# Patient Record
Sex: FEMALE | Race: BLACK OR AFRICAN AMERICAN | HISPANIC OR LATINO | URBAN - METROPOLITAN AREA
[De-identification: names, ages, dates, MRNs, and addresses within clinical notes are randomized per-mention and may not be internally consistent; named-entity substitution may affect disease eponyms.]

---

## 2019-02-15 PROBLEM — Z00.00 ENCOUNTER FOR PREVENTIVE HEALTH EXAMINATION: Status: ACTIVE | Noted: 2019-02-15

## 2019-03-05 ENCOUNTER — OUTPATIENT (OUTPATIENT)
Dept: OUTPATIENT SERVICES | Facility: HOSPITAL | Age: 54
LOS: 1 days | Discharge: HOME | End: 2019-03-05
Payer: MEDICARE

## 2019-03-05 DIAGNOSIS — I10 ESSENTIAL (PRIMARY) HYPERTENSION: ICD-10-CM

## 2019-03-05 DIAGNOSIS — Z79.82 LONG TERM (CURRENT) USE OF ASPIRIN: ICD-10-CM

## 2019-03-05 DIAGNOSIS — R55 SYNCOPE AND COLLAPSE: ICD-10-CM

## 2019-03-05 DIAGNOSIS — Z79.84 LONG TERM (CURRENT) USE OF ORAL HYPOGLYCEMIC DRUGS: ICD-10-CM

## 2019-03-05 DIAGNOSIS — E05.90 THYROTOXICOSIS, UNSPECIFIED WITHOUT THYROTOXIC CRISIS OR STORM: ICD-10-CM

## 2019-03-05 DIAGNOSIS — E78.5 HYPERLIPIDEMIA, UNSPECIFIED: ICD-10-CM

## 2019-03-05 DIAGNOSIS — E11.9 TYPE 2 DIABETES MELLITUS WITHOUT COMPLICATIONS: ICD-10-CM

## 2019-03-05 DIAGNOSIS — R00.2 PALPITATIONS: ICD-10-CM

## 2019-03-05 PROCEDURE — 33285 INSJ SUBQ CAR RHYTHM MNTR: CPT

## 2019-03-05 PROCEDURE — C1764: CPT

## 2020-02-26 ENCOUNTER — APPOINTMENT (OUTPATIENT)
Dept: HEART AND VASCULAR | Facility: CLINIC | Age: 55
End: 2020-02-26
Payer: MEDICARE

## 2020-02-26 VITALS
WEIGHT: 106 LBS | HEART RATE: 78 BPM | DIASTOLIC BLOOD PRESSURE: 65 MMHG | SYSTOLIC BLOOD PRESSURE: 133 MMHG | HEIGHT: 60 IN | BODY MASS INDEX: 20.81 KG/M2

## 2020-02-26 DIAGNOSIS — E11.3413 TYPE 2 DIABETES MELLITUS WITH SEVERE NONPROLIFERATIVE DIABETIC RETINOPATHY WITH MACULAR EDEMA, BILATERAL: ICD-10-CM

## 2020-02-26 DIAGNOSIS — I10 ESSENTIAL (PRIMARY) HYPERTENSION: ICD-10-CM

## 2020-02-26 DIAGNOSIS — E11.9 TYPE 2 DIABETES MELLITUS W/OUT COMPLICATIONS: ICD-10-CM

## 2020-02-26 DIAGNOSIS — Z79.4 TYPE 2 DIABETES MELLITUS WITH SEVERE NONPROLIFERATIVE DIABETIC RETINOPATHY WITH MACULAR EDEMA, BILATERAL: ICD-10-CM

## 2020-02-26 DIAGNOSIS — E78.5 HYPERLIPIDEMIA, UNSPECIFIED: ICD-10-CM

## 2020-02-26 PROCEDURE — 93290 INTERROG DEV EVAL ICPMS IP: CPT

## 2020-04-13 PROBLEM — E11.9 DIABETES TYPE 2, CONTROLLED: Status: ACTIVE | Noted: 2020-04-13

## 2020-04-13 PROBLEM — E78.5 HYPERLIPIDEMIA, MILD: Status: ACTIVE | Noted: 2020-04-13

## 2020-04-13 PROBLEM — E11.3413 CONTROLLED TYPE 2 DIABETES MELLITUS WITH BOTH EYES AFFECTED BY SEVERE NONPROLIFERATIVE RETINOPATHY AND MACULAR EDEMA, WITH LONG-TERM CURRENT USE OF INSULIN: Status: RESOLVED | Noted: 2020-04-13 | Resolved: 2020-04-13

## 2020-04-13 RX ORDER — METFORMIN HYDROCHLORIDE 1000 MG/1
1000 TABLET, FILM COATED, EXTENDED RELEASE ORAL
Qty: 60 | Refills: 0 | Status: ACTIVE | COMMUNITY
Start: 2020-04-13

## 2020-04-13 NOTE — HISTORY OF PRESENT ILLNESS
[de-identified] : Ms. Ruiz is a 54 year-old female with type II DM, HLD, and HTN who initially presented with recurrent syncope.  She reported 6 to 7 events with the last event occurring in January 2019.  She denies any prodrome and any significant trauma from the episodes.  She could not identify and triggers or behaviors that lead to the syncopal episodes.  She denied any associated symptoms, including chest pain, SOB, palpitations, dizziness, nausea, diaphoresis, incontinence, and a post-ictal period.  As such she underwent ILR implantation in 3/2019 and presents for device interrogation.  No episodes of syncope or dizziness since last visit

## 2020-04-13 NOTE — PROCEDURE
[de-identified] : Medtronic Linq\par No tachy or bradyarrhythmias\par Patient activations by accident and correspond to NSR

## 2020-04-13 NOTE — DISCUSSION/SUMMARY
[FreeTextEntry1] : Ms. Ruiz is a 54 year-old female with a history of recurrent syncope with no episodes for over 1 year.  I suspect this is related to her efforts to maintain adequate fluid and salt intake and have advised she continue to do so.  Her ILR shows no arrhythmias of concern.  She will return in 6 months or sooner if needed.

## 2020-09-28 ENCOUNTER — APPOINTMENT (OUTPATIENT)
Dept: HEART AND VASCULAR | Facility: CLINIC | Age: 55
End: 2020-09-28
Payer: MEDICARE

## 2020-09-28 VITALS
HEIGHT: 60 IN | OXYGEN SATURATION: 100 % | BODY MASS INDEX: 20.81 KG/M2 | SYSTOLIC BLOOD PRESSURE: 135 MMHG | HEART RATE: 70 BPM | WEIGHT: 106 LBS | DIASTOLIC BLOOD PRESSURE: 63 MMHG | TEMPERATURE: 98.7 F

## 2020-09-28 PROCEDURE — 93285 PRGRMG DEV EVAL SCRMS IP: CPT

## 2020-09-28 RX ORDER — ONDANSETRON 8 MG/1
8 TABLET ORAL
Qty: 30 | Refills: 0 | Status: ACTIVE | COMMUNITY
Start: 2020-09-23

## 2020-09-28 RX ORDER — PIOGLITAZONE HYDROCHLORIDE 15 MG/1
15 TABLET ORAL DAILY
Qty: 30 | Refills: 0 | Status: DISCONTINUED | COMMUNITY
Start: 2020-04-13 | End: 2020-09-28

## 2020-09-28 RX ORDER — PEN NEEDLE, DIABETIC 32GX 5/32"
NEEDLE, DISPOSABLE MISCELLANEOUS
Qty: 180 | Refills: 0 | Status: DISCONTINUED | COMMUNITY
Start: 2020-09-07

## 2020-09-28 RX ORDER — TOPIRAMATE 100 MG/1
100 TABLET, FILM COATED ORAL
Qty: 30 | Refills: 0 | Status: DISCONTINUED | COMMUNITY
Start: 2020-09-23

## 2020-09-28 RX ORDER — MECLIZINE HYDROCHLORIDE 25 MG/1
25 TABLET ORAL
Qty: 30 | Refills: 0 | Status: DISCONTINUED | COMMUNITY
Start: 2020-09-18

## 2020-09-28 RX ORDER — METFORMIN HYDROCHLORIDE 1000 MG/1
1000 TABLET, COATED ORAL
Qty: 180 | Refills: 0 | Status: DISCONTINUED | COMMUNITY
Start: 2020-08-18

## 2020-09-28 RX ORDER — HYDROCHLOROTHIAZIDE 12.5 MG/1
12.5 CAPSULE ORAL DAILY
Qty: 30 | Refills: 0 | Status: DISCONTINUED | COMMUNITY
Start: 2020-04-13 | End: 2020-09-28

## 2020-09-28 RX ORDER — ATORVASTATIN CALCIUM 20 MG/1
20 TABLET, FILM COATED ORAL
Qty: 90 | Refills: 0 | Status: ACTIVE | COMMUNITY
Start: 2020-09-11

## 2020-09-28 RX ORDER — IBUPROFEN 600 MG/1
600 TABLET, FILM COATED ORAL
Qty: 270 | Refills: 0 | Status: DISCONTINUED | COMMUNITY
Start: 2020-08-18

## 2020-09-28 RX ORDER — ISOSORBIDE MONONITRATE 60 MG/1
60 TABLET, EXTENDED RELEASE ORAL
Qty: 90 | Refills: 0 | Status: DISCONTINUED | COMMUNITY
Start: 2020-08-18

## 2020-09-28 RX ORDER — SUMATRIPTAN 50 MG/1
50 TABLET, FILM COATED ORAL
Qty: 9 | Refills: 0 | Status: DISCONTINUED | COMMUNITY
Start: 2020-09-23

## 2020-09-28 RX ORDER — CYCLOBENZAPRINE HYDROCHLORIDE 5 MG/1
5 TABLET, FILM COATED ORAL
Qty: 90 | Refills: 0 | Status: DISCONTINUED | COMMUNITY
Start: 2020-08-18

## 2020-09-28 RX ORDER — AMLODIPINE BESYLATE 2.5 MG/1
2.5 TABLET ORAL
Qty: 30 | Refills: 0 | Status: DISCONTINUED | COMMUNITY
Start: 2020-09-18

## 2020-09-28 RX ORDER — AMANTADINE HYDROCHLORIDE 100 1/1
100 TABLET ORAL
Qty: 265 | Refills: 0 | Status: DISCONTINUED | COMMUNITY
Start: 2020-07-16

## 2020-09-28 RX ORDER — NYSTATIN 100000 [USP'U]/G
100000 POWDER TOPICAL
Qty: 60 | Refills: 0 | Status: DISCONTINUED | COMMUNITY
Start: 2020-04-28

## 2020-09-28 RX ORDER — MECLIZINE HYDROCHLORIDE 12.5 MG/1
12.5 TABLET ORAL
Qty: 90 | Refills: 0 | Status: DISCONTINUED | COMMUNITY
Start: 2020-06-17

## 2020-09-28 RX ORDER — DICLOFENAC SODIUM 50 MG/1
50 TABLET, DELAYED RELEASE ORAL
Qty: 30 | Refills: 0 | Status: DISCONTINUED | COMMUNITY
Start: 2020-09-23

## 2020-09-28 RX ORDER — AMMONIUM LACTATE 12 %
12 CREAM (GRAM) TOPICAL
Qty: 385 | Refills: 0 | Status: DISCONTINUED | COMMUNITY
Start: 2020-09-18

## 2020-09-28 RX ORDER — CLOTRIMAZOLE 10 MG/ML
1 SOLUTION TOPICAL
Qty: 31 | Refills: 0 | Status: DISCONTINUED | COMMUNITY
Start: 2020-04-28

## 2020-09-28 RX ORDER — GABAPENTIN 300 MG/1
300 CAPSULE ORAL
Qty: 90 | Refills: 0 | Status: DISCONTINUED | COMMUNITY
Start: 2020-08-18

## 2020-09-28 RX ORDER — METOPROLOL SUCCINATE 100 MG/1
100 TABLET, EXTENDED RELEASE ORAL DAILY
Qty: 30 | Refills: 5 | Status: DISCONTINUED | COMMUNITY
Start: 2020-04-13 | End: 2020-09-28

## 2020-09-28 RX ORDER — TRAZODONE HYDROCHLORIDE 50 MG/1
50 TABLET ORAL
Qty: 30 | Refills: 0 | Status: DISCONTINUED | COMMUNITY
Start: 2020-08-18

## 2020-09-28 RX ORDER — ALCOHOL ANTISEPTIC PADS
70 PADS, MEDICATED (EA) TOPICAL
Qty: 180 | Refills: 0 | Status: DISCONTINUED | COMMUNITY
Start: 2020-04-13

## 2020-09-28 RX ORDER — MIDODRINE HYDROCHLORIDE 5 MG/1
5 TABLET ORAL
Qty: 20 | Refills: 0 | Status: ACTIVE | COMMUNITY
Start: 2020-09-18

## 2020-09-28 RX ORDER — DICLOFENAC POTASSIUM 50 MG/1
50 POWDER, FOR SOLUTION ORAL
Qty: 9 | Refills: 0 | Status: DISCONTINUED | COMMUNITY
Start: 2020-09-23

## 2020-09-28 RX ORDER — MIRTAZAPINE 45 MG/1
45 TABLET, FILM COATED ORAL
Qty: 30 | Refills: 0 | Status: DISCONTINUED | COMMUNITY
Start: 2020-08-21

## 2020-09-28 NOTE — DISCUSSION/SUMMARY
[FreeTextEntry1] : Ms. Ruiz is a 54 year-old woman with a history of recurrent unexplained syncope without prodrome. She was symptom free x 1 year, but unfortunately had another fainting episode. Her ILR interrogation revealed the cause of the syncope is not due to an arrhythmia. Her symptoms did previously improve with adequate fluid and salt intake and we have advised she continue to do so. Her medication list reveals atenolol and chlorthalidone, but also midodrine. We will discuss the the need for chlorthalidone with her PMD Dr. Palma as this may be contributing to her symptoms. Ms. Ruiz appeared to understand the whole discussion and verbalized that all of her questions were answered to her satisfaction.

## 2020-09-28 NOTE — PROCEDURE
[de-identified] : Medtronic Linq\par No tachy or bradyarrhythmias\par Patient activations by accident and correspond to NSR

## 2020-09-28 NOTE — HISTORY OF PRESENT ILLNESS
[de-identified] : Ms. Ruiz is a 54 year-old woman with type II DM, HLD, and HTN who initially presented with recurrent unexplained syncope.  She reported 6 to 7 events with the last event occurring in January 2019.  She denies any prodrome and any significant trauma from the episodes.  She could not identify and triggers or behaviors that lead to the syncopal episodes.  She denied any associated symptoms, including chest pain, SOB, palpitations, dizziness, nausea, diaphoresis, incontinence, and a post-ictal period.  As such she underwent ILR implantation in 3/2019 and had no events until July 2020. On 7/12/20, she had another episode and she presented to the [facility] ER who interrogated her device and discharged her home after telling her that her workup was normal. She also checked her blood sugar level after this episode and this was also normal.\par \par She is on multiple medications that may contribute to vasodilatory syncope and also on midodrine. These are managed by her PMD.

## 2021-01-25 ENCOUNTER — APPOINTMENT (OUTPATIENT)
Dept: HEART AND VASCULAR | Facility: CLINIC | Age: 56
End: 2021-01-25
Payer: MEDICARE

## 2021-01-25 VITALS
BODY MASS INDEX: 20.42 KG/M2 | HEIGHT: 60 IN | SYSTOLIC BLOOD PRESSURE: 129 MMHG | WEIGHT: 104 LBS | TEMPERATURE: 97 F | HEART RATE: 69 BPM | DIASTOLIC BLOOD PRESSURE: 74 MMHG

## 2021-01-25 PROCEDURE — 99212 OFFICE O/P EST SF 10 MIN: CPT | Mod: 25

## 2021-01-25 PROCEDURE — 99072 ADDL SUPL MATRL&STAF TM PHE: CPT

## 2021-01-25 PROCEDURE — 93285 PRGRMG DEV EVAL SCRMS IP: CPT

## 2021-01-25 RX ORDER — BUSPIRONE HYDROCHLORIDE 10 MG/1
10 TABLET ORAL DAILY
Refills: 0 | Status: ACTIVE | COMMUNITY
Start: 2020-09-18

## 2021-01-26 NOTE — DISCUSSION/SUMMARY
[FreeTextEntry1] : Ms. Riuz is a 55 year-old woman with a history of recurrent unexplained syncope without prodrome. She was symptom free x 1 year, but unfortunately had another fainting episode.  This did not correlate with any arrhythmia on ILR interrogation.  She has recently been diagnosed with BPPV.  Advised to continue adequate fluid and salt intake.  She will return for follow-up in six months or sooner as needed.

## 2021-01-26 NOTE — REASON FOR VISIT
[[employer] ] : provided by [employer]   [FreeTextEntry1] : [id number] [TWNoteComboBox1] : [place]

## 2021-01-26 NOTE — PROCEDURE
[de-identified] : Medtronic Linq\par No tachy or bradyarrhythmias\par Patient activations by accident and correspond to NSR

## 2021-01-26 NOTE — HISTORY OF PRESENT ILLNESS
[de-identified] : Ms. Ruiz is a 54 year-old woman with type II DM, HLD, and HTN who initially presented with recurrent unexplained syncope.  She reported 6 to 7 events with the last event occurring in January 2019.  She denies any prodrome and any significant trauma from the episodes.  She could not identify and triggers or behaviors that lead to the syncopal episodes.  She denied any associated symptoms, including chest pain, SOB, palpitations, dizziness, nausea, diaphoresis, incontinence, and a post-ictal period.  As such she underwent ILR implantation in 3/2019 and had no events until July 2020. On 7/12/20, she had another episode and she presented to the [facility] ER who interrogated her device and discharged her home after telling her that her workup was normal. She also checked her blood sugar level after this episode and this was also normal.\par \par No recurrent syncope since her last visit.  Interval history significant for diagnosis of BPPV; being managed at [facility].  \par  \par She is on multiple medications that may contribute to vasodilatory syncope and also on midodrine. These are managed by her PMD.

## 2021-02-26 ENCOUNTER — APPOINTMENT (OUTPATIENT)
Dept: HEART AND VASCULAR | Facility: CLINIC | Age: 56
End: 2021-02-26
Payer: MEDICARE

## 2021-02-26 ENCOUNTER — NON-APPOINTMENT (OUTPATIENT)
Age: 56
End: 2021-02-26

## 2021-02-26 PROCEDURE — 93298 REM INTERROG DEV EVAL SCRMS: CPT

## 2021-02-26 PROCEDURE — G2066: CPT

## 2021-04-07 ENCOUNTER — APPOINTMENT (OUTPATIENT)
Dept: HEART AND VASCULAR | Facility: CLINIC | Age: 56
End: 2021-04-07
Payer: MEDICARE

## 2021-04-07 ENCOUNTER — NON-APPOINTMENT (OUTPATIENT)
Age: 56
End: 2021-04-07

## 2021-04-07 PROCEDURE — 93298 REM INTERROG DEV EVAL SCRMS: CPT

## 2021-04-07 PROCEDURE — G2066: CPT

## 2021-05-18 ENCOUNTER — APPOINTMENT (OUTPATIENT)
Dept: HEART AND VASCULAR | Facility: CLINIC | Age: 56
End: 2021-05-18
Payer: MEDICARE

## 2021-05-18 ENCOUNTER — NON-APPOINTMENT (OUTPATIENT)
Age: 56
End: 2021-05-18

## 2021-05-18 PROCEDURE — G2066: CPT

## 2021-05-18 PROCEDURE — 93298 REM INTERROG DEV EVAL SCRMS: CPT

## 2021-06-22 ENCOUNTER — APPOINTMENT (OUTPATIENT)
Dept: HEART AND VASCULAR | Facility: CLINIC | Age: 56
End: 2021-06-22
Payer: MEDICARE

## 2021-06-22 ENCOUNTER — NON-APPOINTMENT (OUTPATIENT)
Age: 56
End: 2021-06-22

## 2021-06-22 PROCEDURE — 93298 REM INTERROG DEV EVAL SCRMS: CPT

## 2021-06-22 PROCEDURE — G2066: CPT

## 2021-07-26 ENCOUNTER — APPOINTMENT (OUTPATIENT)
Dept: HEART AND VASCULAR | Facility: CLINIC | Age: 56
End: 2021-07-26
Payer: MEDICARE

## 2021-07-26 VITALS
HEIGHT: 60 IN | TEMPERATURE: 97.2 F | BODY MASS INDEX: 20.03 KG/M2 | HEART RATE: 72 BPM | DIASTOLIC BLOOD PRESSURE: 76 MMHG | SYSTOLIC BLOOD PRESSURE: 134 MMHG | WEIGHT: 102 LBS

## 2021-07-26 PROCEDURE — 93285 PRGRMG DEV EVAL SCRMS IP: CPT

## 2021-07-26 RX ORDER — ATENOLOL AND CHLORTHALIDONE 50; 25 MG/1; MG/1
50-25 TABLET ORAL
Qty: 90 | Refills: 0 | Status: DISCONTINUED | COMMUNITY
Start: 2020-08-18 | End: 2021-07-26

## 2021-07-26 RX ORDER — OMEPRAZOLE 40 MG/1
40 CAPSULE, DELAYED RELEASE ORAL
Qty: 30 | Refills: 0 | Status: COMPLETED | COMMUNITY
Start: 2021-04-09

## 2021-07-26 RX ORDER — AMLODIPINE BESYLATE 5 MG/1
5 TABLET ORAL
Qty: 30 | Refills: 0 | Status: ACTIVE | COMMUNITY
Start: 2021-07-22

## 2021-07-26 RX ORDER — DAPAGLIFLOZIN 10 MG/1
10 TABLET, FILM COATED ORAL
Qty: 90 | Refills: 0 | Status: ACTIVE | COMMUNITY
Start: 2021-06-28

## 2021-07-26 RX ORDER — DICLOFENAC SODIUM 75 MG/1
75 TABLET, DELAYED RELEASE ORAL
Qty: 60 | Refills: 0 | Status: COMPLETED | COMMUNITY
Start: 2021-07-02

## 2021-07-26 RX ORDER — EMPAGLIFLOZIN 25 MG/1
25 TABLET, FILM COATED ORAL
Qty: 90 | Refills: 0 | Status: COMPLETED | COMMUNITY
Start: 2021-07-22

## 2021-07-26 RX ORDER — ATENOLOL 50 MG/1
50 TABLET ORAL
Qty: 90 | Refills: 0 | Status: COMPLETED | COMMUNITY
Start: 2021-07-22

## 2021-07-26 RX ORDER — GABAPENTIN 100 MG/1
100 CAPSULE ORAL
Qty: 30 | Refills: 0 | Status: ACTIVE | COMMUNITY
Start: 2021-07-22

## 2021-07-26 RX ORDER — HYDROCORTISONE 1 %
12 CREAM (GRAM) TOPICAL
Qty: 400 | Refills: 0 | Status: COMPLETED | COMMUNITY
Start: 2021-02-17

## 2021-07-26 RX ORDER — ATORVASTATIN CALCIUM 40 MG/1
40 TABLET, FILM COATED ORAL
Qty: 90 | Refills: 0 | Status: ACTIVE | COMMUNITY
Start: 2021-03-01

## 2021-07-26 RX ORDER — BLOOD SUGAR DIAGNOSTIC
STRIP MISCELLANEOUS
Qty: 50 | Refills: 0 | Status: COMPLETED | COMMUNITY
Start: 2021-05-14

## 2021-07-26 RX ORDER — PROPRANOLOL HYDROCHLORIDE 80 MG/1
80 CAPSULE, EXTENDED RELEASE ORAL
Qty: 90 | Refills: 0 | Status: ACTIVE | COMMUNITY
Start: 2021-06-28

## 2021-07-26 RX ORDER — GLIPIZIDE 10 MG/1
10 TABLET ORAL
Qty: 90 | Refills: 0 | Status: DISCONTINUED | COMMUNITY
Start: 2020-08-18 | End: 2021-07-26

## 2021-07-26 RX ORDER — EMPAGLIFLOZIN 10 MG/1
10 TABLET, FILM COATED ORAL
Qty: 30 | Refills: 0 | Status: COMPLETED | COMMUNITY
Start: 2021-06-12

## 2021-07-26 RX ORDER — TIZANIDINE 4 MG/1
4 TABLET ORAL
Qty: 30 | Refills: 0 | Status: ACTIVE | COMMUNITY
Start: 2021-07-22

## 2021-07-26 RX ORDER — BUSPIRONE HYDROCHLORIDE 30 MG/1
30 TABLET ORAL
Qty: 60 | Refills: 0 | Status: COMPLETED | COMMUNITY
Start: 2021-07-22

## 2021-07-26 RX ORDER — CHLORHEXIDINE GLUCONATE, 0.12% ORAL RINSE 1.2 MG/ML
0.12 SOLUTION DENTAL
Qty: 946 | Refills: 0 | Status: COMPLETED | COMMUNITY
Start: 2021-05-25

## 2021-07-26 RX ORDER — LOSARTAN POTASSIUM 50 MG/1
50 TABLET, FILM COATED ORAL
Qty: 90 | Refills: 0 | Status: ACTIVE | COMMUNITY
Start: 2021-06-28

## 2021-07-26 RX ORDER — DICLOFENAC SODIUM 1% 10 MG/G
1 GEL TOPICAL
Qty: 100 | Refills: 0 | Status: COMPLETED | COMMUNITY
Start: 2021-06-12

## 2021-07-26 RX ORDER — FLUTICASONE PROPIONATE 50 UG/1
50 SPRAY, METERED NASAL
Qty: 16 | Refills: 0 | Status: COMPLETED | COMMUNITY
Start: 2021-07-20

## 2021-07-26 RX ORDER — AZELASTINE HYDROCHLORIDE 137 UG/1
0.1 SPRAY, METERED NASAL
Qty: 30 | Refills: 0 | Status: COMPLETED | COMMUNITY
Start: 2021-07-20

## 2021-07-26 NOTE — HISTORY OF PRESENT ILLNESS
[de-identified] : Ms. Ruiz is a 55 year-old woman with type II DM, HLD, and HTN who initially presented with recurrent unexplained syncope.  She reported 6 to 7 events with the last event occurring in January 2019.  She denies any prodrome and any significant trauma from the episodes.  She could not identify and triggers or behaviors that lead to the syncopal episodes.  She denied any associated symptoms, including chest pain, SOB, palpitations, dizziness, nausea, diaphoresis, incontinence, and a post-ictal period.  As such she underwent ILR implantation in 3/2019 and had no events until July 2020. On 7/12/20, she had another episode and she presented to the [facility] ER who interrogated her device and discharged her home after telling her that her workup was normal. She also checked her blood sugar level after this episode and this was also normal.   History significant for diagnosis of BPPV; being managed at [facility].  \par \par No recurrent syncope since her last visit.\par  \par She is on multiple medications that may contribute to vasodilatory syncope and also on midodrine. These are managed by her PMD.

## 2021-07-26 NOTE — DISCUSSION/SUMMARY
[FreeTextEntry1] : Ms. Ruiz is a 55 year-old woman with a history of recurrent unexplained syncope without prodrome. She was symptom free x 1 year, but unfortunately had another fainting episode.  This did not correlate with any arrhythmia on ILR interrogation.  She has recently been diagnosed with BPPV but reports this is managed with current medication regimen.  Advised to continue adequate fluid and salt intake.  She will return for follow-up in six months or sooner as needed.

## 2021-07-26 NOTE — PROCEDURE
[de-identified] : Medtronic Linq\par sensing 0.58mV\par No tachy or bradyarrhythmias\par Patient activations correspond to NSR

## 2021-08-30 ENCOUNTER — NON-APPOINTMENT (OUTPATIENT)
Age: 56
End: 2021-08-30

## 2021-08-30 ENCOUNTER — APPOINTMENT (OUTPATIENT)
Dept: HEART AND VASCULAR | Facility: CLINIC | Age: 56
End: 2021-08-30
Payer: MEDICARE

## 2021-08-30 PROCEDURE — 93298 REM INTERROG DEV EVAL SCRMS: CPT

## 2021-08-30 PROCEDURE — G2066: CPT

## 2021-09-29 ENCOUNTER — RESULT REVIEW (OUTPATIENT)
Age: 56
End: 2021-09-29

## 2021-10-04 ENCOUNTER — NON-APPOINTMENT (OUTPATIENT)
Age: 56
End: 2021-10-04

## 2021-10-04 ENCOUNTER — APPOINTMENT (OUTPATIENT)
Dept: HEART AND VASCULAR | Facility: CLINIC | Age: 56
End: 2021-10-04
Payer: MEDICARE

## 2021-10-04 PROCEDURE — 93298 REM INTERROG DEV EVAL SCRMS: CPT

## 2021-10-04 PROCEDURE — G2066: CPT

## 2021-10-06 PROBLEM — I10 ESSENTIAL HYPERTENSION: Status: ACTIVE | Noted: 2020-04-13

## 2021-10-13 ENCOUNTER — INPATIENT (INPATIENT)
Facility: HOSPITAL | Age: 56
LOS: 1 days | Discharge: HOME | DRG: 244 | End: 2021-10-15
Attending: INTERNAL MEDICINE | Admitting: INTERNAL MEDICINE
Payer: MEDICARE

## 2021-10-13 VITALS
OXYGEN SATURATION: 98 % | DIASTOLIC BLOOD PRESSURE: 85 MMHG | SYSTOLIC BLOOD PRESSURE: 139 MMHG | RESPIRATION RATE: 16 BRPM | WEIGHT: 108.03 LBS | TEMPERATURE: 98 F | HEART RATE: 75 BPM

## 2021-10-13 DIAGNOSIS — G43.909 MIGRAINE, UNSPECIFIED, NOT INTRACTABLE, WITHOUT STATUS MIGRAINOSUS: ICD-10-CM

## 2021-10-13 DIAGNOSIS — Z29.9 ENCOUNTER FOR PROPHYLACTIC MEASURES, UNSPECIFIED: ICD-10-CM

## 2021-10-13 DIAGNOSIS — E11.9 TYPE 2 DIABETES MELLITUS WITHOUT COMPLICATIONS: ICD-10-CM

## 2021-10-13 DIAGNOSIS — F41.9 ANXIETY DISORDER, UNSPECIFIED: ICD-10-CM

## 2021-10-13 DIAGNOSIS — R55 SYNCOPE AND COLLAPSE: ICD-10-CM

## 2021-10-13 DIAGNOSIS — I10 ESSENTIAL (PRIMARY) HYPERTENSION: ICD-10-CM

## 2021-10-13 DIAGNOSIS — E78.5 HYPERLIPIDEMIA, UNSPECIFIED: ICD-10-CM

## 2021-10-13 DIAGNOSIS — Z98.890 OTHER SPECIFIED POSTPROCEDURAL STATES: Chronic | ICD-10-CM

## 2021-10-13 LAB
ALBUMIN SERPL ELPH-MCNC: 5.2 G/DL — HIGH (ref 3.3–5)
ALP SERPL-CCNC: 102 U/L — SIGNIFICANT CHANGE UP (ref 40–120)
ALT FLD-CCNC: 19 U/L — SIGNIFICANT CHANGE UP (ref 10–45)
ANION GAP SERPL CALC-SCNC: 11 MMOL/L — SIGNIFICANT CHANGE UP (ref 5–17)
APTT BLD: 30.4 SEC — SIGNIFICANT CHANGE UP (ref 27.5–35.5)
AST SERPL-CCNC: 22 U/L — SIGNIFICANT CHANGE UP (ref 10–40)
BASOPHILS # BLD AUTO: 0.03 K/UL — SIGNIFICANT CHANGE UP (ref 0–0.2)
BASOPHILS NFR BLD AUTO: 0.8 % — SIGNIFICANT CHANGE UP (ref 0–2)
BILIRUB SERPL-MCNC: 0.2 MG/DL — SIGNIFICANT CHANGE UP (ref 0.2–1.2)
BUN SERPL-MCNC: 20 MG/DL — SIGNIFICANT CHANGE UP (ref 7–23)
CALCIUM SERPL-MCNC: 10.5 MG/DL — SIGNIFICANT CHANGE UP (ref 8.4–10.5)
CHLORIDE SERPL-SCNC: 100 MMOL/L — SIGNIFICANT CHANGE UP (ref 96–108)
CO2 SERPL-SCNC: 31 MMOL/L — SIGNIFICANT CHANGE UP (ref 22–31)
CREAT SERPL-MCNC: 0.93 MG/DL — SIGNIFICANT CHANGE UP (ref 0.5–1.3)
EOSINOPHIL # BLD AUTO: 0.05 K/UL — SIGNIFICANT CHANGE UP (ref 0–0.5)
EOSINOPHIL NFR BLD AUTO: 1.4 % — SIGNIFICANT CHANGE UP (ref 0–6)
GLUCOSE BLDC GLUCOMTR-MCNC: 244 MG/DL — HIGH (ref 70–99)
GLUCOSE SERPL-MCNC: 260 MG/DL — HIGH (ref 70–99)
HCT VFR BLD CALC: 45.4 % — HIGH (ref 34.5–45)
HGB BLD-MCNC: 14.2 G/DL — SIGNIFICANT CHANGE UP (ref 11.5–15.5)
IMM GRANULOCYTES NFR BLD AUTO: 0.3 % — SIGNIFICANT CHANGE UP (ref 0–1.5)
INR BLD: 1.13 — SIGNIFICANT CHANGE UP (ref 0.88–1.16)
LYMPHOCYTES # BLD AUTO: 1.02 K/UL — SIGNIFICANT CHANGE UP (ref 1–3.3)
LYMPHOCYTES # BLD AUTO: 27.9 % — SIGNIFICANT CHANGE UP (ref 13–44)
MAGNESIUM SERPL-MCNC: 2 MG/DL — SIGNIFICANT CHANGE UP (ref 1.6–2.6)
MCHC RBC-ENTMCNC: 27.7 PG — SIGNIFICANT CHANGE UP (ref 27–34)
MCHC RBC-ENTMCNC: 31.3 GM/DL — LOW (ref 32–36)
MCV RBC AUTO: 88.5 FL — SIGNIFICANT CHANGE UP (ref 80–100)
MONOCYTES # BLD AUTO: 0.33 K/UL — SIGNIFICANT CHANGE UP (ref 0–0.9)
MONOCYTES NFR BLD AUTO: 9 % — SIGNIFICANT CHANGE UP (ref 2–14)
NEUTROPHILS # BLD AUTO: 2.22 K/UL — SIGNIFICANT CHANGE UP (ref 1.8–7.4)
NEUTROPHILS NFR BLD AUTO: 60.6 % — SIGNIFICANT CHANGE UP (ref 43–77)
NRBC # BLD: 0 /100 WBCS — SIGNIFICANT CHANGE UP (ref 0–0)
NT-PROBNP SERPL-SCNC: 62 PG/ML — SIGNIFICANT CHANGE UP (ref 0–300)
PLATELET # BLD AUTO: 276 K/UL — SIGNIFICANT CHANGE UP (ref 150–400)
POTASSIUM SERPL-MCNC: 3.7 MMOL/L — SIGNIFICANT CHANGE UP (ref 3.5–5.3)
POTASSIUM SERPL-SCNC: 3.7 MMOL/L — SIGNIFICANT CHANGE UP (ref 3.5–5.3)
PROT SERPL-MCNC: 8.3 G/DL — SIGNIFICANT CHANGE UP (ref 6–8.3)
PROTHROM AB SERPL-ACNC: 13.5 SEC — SIGNIFICANT CHANGE UP (ref 10.6–13.6)
RBC # BLD: 5.13 M/UL — SIGNIFICANT CHANGE UP (ref 3.8–5.2)
RBC # FLD: 13.4 % — SIGNIFICANT CHANGE UP (ref 10.3–14.5)
SARS-COV-2 RNA SPEC QL NAA+PROBE: NEGATIVE — SIGNIFICANT CHANGE UP
SODIUM SERPL-SCNC: 142 MMOL/L — SIGNIFICANT CHANGE UP (ref 135–145)
TROPONIN T SERPL-MCNC: 0.01 NG/ML — SIGNIFICANT CHANGE UP (ref 0–0.01)
WBC # BLD: 3.66 K/UL — LOW (ref 3.8–10.5)
WBC # FLD AUTO: 3.66 K/UL — LOW (ref 3.8–10.5)

## 2021-10-13 PROCEDURE — 93010 ELECTROCARDIOGRAM REPORT: CPT

## 2021-10-13 PROCEDURE — 71045 X-RAY EXAM CHEST 1 VIEW: CPT | Mod: 26

## 2021-10-13 PROCEDURE — 99285 EMERGENCY DEPT VISIT HI MDM: CPT

## 2021-10-13 RX ORDER — DEXTROSE 50 % IN WATER 50 %
12.5 SYRINGE (ML) INTRAVENOUS ONCE
Refills: 0 | Status: DISCONTINUED | OUTPATIENT
Start: 2021-10-13 | End: 2021-10-15

## 2021-10-13 RX ORDER — SODIUM CHLORIDE 9 MG/ML
1000 INJECTION, SOLUTION INTRAVENOUS
Refills: 0 | Status: DISCONTINUED | OUTPATIENT
Start: 2021-10-13 | End: 2021-10-15

## 2021-10-13 RX ORDER — DEXTROSE 50 % IN WATER 50 %
25 SYRINGE (ML) INTRAVENOUS ONCE
Refills: 0 | Status: DISCONTINUED | OUTPATIENT
Start: 2021-10-13 | End: 2021-10-15

## 2021-10-13 RX ORDER — PSYLLIUM SEED (WITH DEXTROSE)
1 POWDER (GRAM) ORAL
Qty: 0 | Refills: 0 | DISCHARGE

## 2021-10-13 RX ORDER — LACTOBACILLUS ACIDOPHILUS 100MM CELL
1 CAPSULE ORAL
Qty: 0 | Refills: 0 | DISCHARGE

## 2021-10-13 RX ORDER — INFLUENZA VIRUS VACCINE 15; 15; 15; 15 UG/.5ML; UG/.5ML; UG/.5ML; UG/.5ML
0.5 SUSPENSION INTRAMUSCULAR ONCE
Refills: 0 | Status: DISCONTINUED | OUTPATIENT
Start: 2021-10-13 | End: 2021-10-15

## 2021-10-13 RX ORDER — ATENOLOL AND CHLORTHALIDONE 50; 25 MG/1; MG/1
1 TABLET ORAL
Qty: 0 | Refills: 0 | DISCHARGE

## 2021-10-13 RX ORDER — INSULIN LISPRO 100/ML
VIAL (ML) SUBCUTANEOUS
Refills: 0 | Status: DISCONTINUED | OUTPATIENT
Start: 2021-10-13 | End: 2021-10-15

## 2021-10-13 RX ORDER — DEXTROSE 50 % IN WATER 50 %
15 SYRINGE (ML) INTRAVENOUS ONCE
Refills: 0 | Status: DISCONTINUED | OUTPATIENT
Start: 2021-10-13 | End: 2021-10-15

## 2021-10-13 RX ORDER — ATORVASTATIN CALCIUM 80 MG/1
1 TABLET, FILM COATED ORAL
Qty: 0 | Refills: 0 | DISCHARGE

## 2021-10-13 RX ORDER — GLUCAGON INJECTION, SOLUTION 0.5 MG/.1ML
1 INJECTION, SOLUTION SUBCUTANEOUS ONCE
Refills: 0 | Status: DISCONTINUED | OUTPATIENT
Start: 2021-10-13 | End: 2021-10-15

## 2021-10-13 RX ADMIN — Medication 2: at 23:20

## 2021-10-13 NOTE — ED ADULT NURSE NOTE - NSICDXPASTMEDICALHX_GEN_ALL_CORE_FT
PAST MEDICAL HISTORY:  Herniated cervical disc     HLD (hyperlipidemia)     HTN (hypertension)     Type 2 diabetes mellitus

## 2021-10-13 NOTE — ED ADULT TRIAGE NOTE - CHIEF COMPLAINT QUOTE
Patient arrives ambulatory reporting palpitations.  Patient had insertable cardiac monitor and was sent by Dr. Ivonne Booth for evaluation.  Denies chest pain/SOB.

## 2021-10-13 NOTE — H&P ADULT - HISTORY OF PRESENT ILLNESS
History obtained from chart and via  # [id number]    Ms. Ruiz is a 55 yo F with type II DM, HLD, HTN, Migraine HA, Low back pain, hx of leukocytosis followed by outpt Heme now WBC normalized, and h/o recurrent unexplained syncope s/p ILR 3/2019 followed by EP Dr. Booth she had not had any recurrent syncope since her last visit on 7/27/21, but an ILR alert showed she had a 15 sec pause which she endorsed she felt symptomatic. She was encouraged to come to ED for further evaluation and plan for dual chamber PPM.  Pt reports that last night while walking from bedroom to bathroom she suddenly felt generalized weakness and called her daughter to help her.  She does not recall passing out but she cannot recollect the entire event of how she got back to bedroom where daughter brought her to lay down.  Pt reports she felt too weak to eat anything and eventually fell asleep.  She denied experiencing any associated symptoms, including chest pain, SOB, palpitations, dizziness, nausea, diaphoresis, incontinence, and a post-ictal period.  Today she reports she feels much better.      In ER, VSS, ECG NSR with nonspecific ST changes, Med negative.  Pt now admitted to Cardiology for telemetry monitoring with plan for PPM tomorrow.      Of Note: Pt reports recently having undergone a Left breast biopsy 9/29/21 2/2 abnormal Sonogram for which she is awaiting results.          **Pt unable to provide list of Medications, Pharmacy closed, and no one at her house.  Meds noted from [facility] for recent Rx are: Amlodipine 5mg, Amlodipine-Olmesartan 5mg-20mg, Farxiga 10mg, Losartan 50mg, MIrtazapine 45mg, Cyclobenzaprine 5mg, Sumatriptan 50mg, Metformin 1000mg BID, Buspirone 30mg, Atorvastatin 20mg, Glipizide 5mg, Tizanidine 4mg, Omeprazole 40mg, Zofran 8mg.

## 2021-10-13 NOTE — H&P ADULT - PROBLEM SELECTOR PLAN 1
-presented with episode of syncope last night with documented 15sec pause on ILR corrosponding to event timing.  -Currently asx, continue telemetry monitoring.  -Hold BB  -Keep NPO after MN for planned PPM with EP Dr Booth  -Check ECHO  -Confirm Med in AM.

## 2021-10-13 NOTE — CONSULT NOTE ADULT - SUBJECTIVE AND OBJECTIVE BOX
HPI:  Ms. Ruiz is a 55 yo F with type II DM, HLD, and HTN and h/o recurrent unexplained syncope s/p ILR 3/2019 followed by EP Dr. Booth she had not had any recurrent syncope since her last visit on 7/27/21, but an ILR alert today showed she had a 15 sec pause which she endorsed she felt symptomatic. She was encouraged to come to ED for further evaluation and plan for dual chamber PPM.     Briefly, she reported 6 to 7 events with the last event occurring in January 2019. She denies any prodrome and any significant trauma from the episodes. She could not identify and triggers or behaviors that lead to the syncopal episodes. She denied any associated symptoms, including chest pain, SOB, palpitations, dizziness, nausea, diaphoresis, incontinence, and a post-ictal period. As such she underwent ILR implantation in 3/2019 and had no events until July 2020. On 7/12/20, she had another episode and she presented to the [facility] ER who interrogated her device and discharged her home after telling her that her workup was normal. She also checked her blood sugar level after this episode and this was also normal. History significant for diagnosis of BPPV; being managed at [facility]. She is on multiple medications that may contribute to vasodilatory syncope and also on midodrine. These are managed by her PMD.       As per pt, she was feeling very faint arond the time of the 15 sec pause and states she felt like she would pass out. The event was logged at 10/12/21 at 9:30PM last night. After speaking with her on the phone, she was encouraged to come in the ED for further evaluation.    PAST MEDICAL & SURGICAL HISTORY:  HTN (hypertension)  Type 2 diabetes mellitus  Herniated cervical disc  HLD (hyperlipidemia)    Social History:  no smoking, no drugs, no alcohol    Home medications:  · amLODIPine Besylate 5 MG Oral Tablet   · Aspirin 81 81 MG TBEC; TAKE 1 TABLET DAILY   · Atorvastatin Calcium 20 MG Oral Tablet   · Atorvastatin Calcium 40 MG Oral Tablet   · busPIRone HCl - 10 MG Oral Tablet; TAKE 1 TABLET DAILY   · Farxiga 10 MG Oral Tablet   · Gabapentin 100 MG Oral Capsule   · Losartan Potassium 50 MG Oral Tablet   · metFORMIN HCl ER (MOD) 1000 MG Oral Tablet Extended Release 24 Hour; TAKE 1 TABLET Every twelve hours   · Midodrine HCl - 5 MG Oral Tablet   · Ondansetron HCl - 8 MG Oral Tablet   · Propranolol HCl ER 80 MG Oral Capsule Extended Release 24 Hour   · tiZANidine HCl - 4 MG Oral Tablet     Inpatient Medications:       Allergies:   No Known Allergies    ROS:   CONSTITUTIONAL: No fever, weight loss + fatigue  EYES: Pt denies  RESPIRATORY: No cough, wheezing, chills or hemoptysis; No Shortness of Breath  CARDIOVASCULAR: see HPI  GASTROINTESTINAL: Pt denies  NEUROLOGICAL: Pt denies  SKIN: Pt denies   PSYCHIATRIC: Pt denies  HEME/LYMPH: Pt denies    PHYSICAL:  T(C): 36.8 (10-13-21 @ 14:06), Max: 36.8 (10-13-21 @ 14:06)  HR: 75 (10-13-21 @ 14:06) (75 - 75)  BP: 139/85 (10-13-21 @ 14:06) (139/85 - 139/85)  RR: 16 (10-13-21 @ 14:06) (16 - 16)  SpO2: 98% (10-13-21 @ 14:06) (98% - 98%)    Appearance: No acute distress, well developed  Eyes: normal appearing conjunctiva, pupils and eyelids  Cardiovascular: Normal S1 S2, No JVD, No murmurs, No edema  Respiratory: Lungs clear to auscultation	bilaterally.  No wheeze, rhonchi, rales noted  Gastrointestinal:  Soft, NT/ND 	  Neurologic:  No deficit noted  Psych: A&Ox3, normal mood/affect  Musculoskeletal: normal gait  Skin: no rash noted, normal color and pigmentation.        LABS:                        14.2   3.66  )-----------( 276      ( 13 Oct 2021 15:00 )             45.4   10-13    142  |  100  |  20  ----------------------------<  260<H>  3.7   |  31  |  0.93    Ca    10.5      13 Oct 2021 15:00  Mg     2.0     10-13    TPro  8.3  /  Alb  5.2<H>  /  TBili  0.2  /  DBili  x   /  AST  22  /  ALT  19  /  AlkPhos  102  10-13    PT/INR - ( 13 Oct 2021 15:00 )   PT: 13.5 sec;   INR: 1.13     PTT - ( 13 Oct 2021 15:00 )  PTT:30.4 sec    Assessment Plan:  Ms. Ruiz is a Italian speaking only 55 yo F with type II DM, HLD, and HTN and h/o recurrent unexplained syncope s/p ILR 3/2019 followed by EP Dr. Booth she had not had any recurrent syncope since her last visit on 7/27/21, but an ILR alert today showed she had a 15 sec pause which she endorsed she felt symptomatic. She was encouraged to come to ED for further evaluation and plan for dual chamber PPM.   - NPO after midnight  - plan for dual chamber pacemaker tomorrow 10/14  - will need consent

## 2021-10-13 NOTE — ED PROVIDER NOTE - CLINICAL SUMMARY MEDICAL DECISION MAKING FREE TEXT BOX
55 yo F, Syriac-speaking (staff  used) with PMH of HTN, NIDDM, HLD, migraines, disc herniation with hx of LBP, sinus problems and syncope with implanted cardiac monitoring device presents with near-syncope and sent in by her EP doctor- Dr. Booth for abnormal findings on the implanted cardiac monitoring device and admission for ?pacemaker. Pt states that she has a long standing hx of syncope for which she had the implanted cardiac monitoring device placed and yesterday while she was getting ready to go to bed and while brushing her teeth she felt very bad with sensation of generalized weakness and pre-syncope. Called her family member who helped her to bed. Pt denies any syncope, fall, head trauma, CP or SOB. Contacted her Cardiologist/ EP's office and was told to come to ED for abnormal findings on implanted device. Pt denies any current sxs. Is fully vaccinated for Covid 19 as of March. Denies fevers, chills, cough, recent illness, N, V, diarrhea, focal weakness or numbness, gait or vision disturbances. No other complaints.

## 2021-10-13 NOTE — ED ADULT NURSE NOTE - NS_SISCREENINGSR_GEN_ALL_ED
Left message for patient to call office   Please send back to [facility] Negative

## 2021-10-13 NOTE — H&P ADULT - PROBLEM SELECTOR PLAN 4
-F/U HgbA1C  -Monitor FSBG and ISS coverage  -Confirm Home DM meds in AM with pharamacy (SUrescripts notes Farxiga and Glipizide)

## 2021-10-13 NOTE — H&P ADULT - NSICDXPASTMEDICALHX_GEN_ALL_CORE_FT
PAST MEDICAL HISTORY:  BPPV (benign paroxysmal positional vertigo)     Herniated cervical disc     HLD (hyperlipidemia)     HTN (hypertension)     Migraine     Type 2 diabetes mellitus

## 2021-10-13 NOTE — H&P ADULT - ASSESSMENT
55 yo F with type II DM, HLD, HTN, Migraine HA, Low back pain, hx of leukocytosis followed by outpt Heme now WBC normalized, and h/o recurrent unexplained syncope s/p ILR 3/2019 now presents for recommended PPM placement after near-syncopal event with 15sec pause noted on ILR.

## 2021-10-13 NOTE — H&P ADULT - NSHPOUTPATIENTPROVIDERS_GEN_ALL_CORE
Dr Diandra Lozano Neurologist [phone]  Dr Viktor Pastor [phone]  Dr Davie Guadarrama [phone]  Dr Jordan Joshua Heme-Onc [phone]  Pharmacy: [facility] [phone]

## 2021-10-13 NOTE — H&P ADULT - PROBLEM SELECTOR PLAN 5
On Migraine meds. Confirm current meds in AM with Pharamacy and f/u with outpt Neurologist. Currently asx.

## 2021-10-13 NOTE — ED PROVIDER NOTE - OBJECTIVE STATEMENT
55 yo F with PMH of HTN, NIDDM, HLD, migraines, disc herniation with hx of LBP, sinus problems and syncope with implanted cardiac moinitoring device 57 yo F, Czech-speaking (staff  used) with PMH of HTN, NIDDM, HLD, migraines, disc herniation with hx of LBP, sinus problems and syncope with implanted cardiac monitoring device presents with near-syncope and sent in by her EP doctor- Dr. Booth for abnormal findings on the implanted cardiac monitoring device and admission for ?pacemaker. Pt states that she has a long standing hx of syncope for which she had the implanted cardiac monitoring device placed and yesterday while she was getting ready to go to bed and while brushing her teeth she felt very bad with sensation of generalized weakness and pre-syncope. Called her family member who helped her to bed. Pt denies any syncope, fall, head trauma, CP or SOB. Contacted her Cardiologist/ EP's office and was told to come to ED for abnormal findings on implanted device. Pt denies any current sxs. Is fully vaccinated for Covid 19 as of March. Denies fevers, chills, cough, recent illness, N, V, diarrhea, focal weakness or numbness, gait or vision disturbances. No other complaints.

## 2021-10-13 NOTE — H&P ADULT - PROBLEM SELECTOR PLAN 7
VTE ppx: SCDs ordered; Pharm ppx held for PPM; Bedrest  Diet: DASH CHO restricted DIet, NPO after Midnight for PPM  DISPO: Home once medically cleared.

## 2021-10-13 NOTE — ED ADULT NURSE NOTE - OBJECTIVE STATEMENT
56y.o Female a&o x4 walked into ED c/o palpitations.  Last night, pt had near syncopal episode "where she felt weak everywhere and like she was going to pass out." Pt then sat down, sniffed alcohol, and then ate soup. pt was wearing halter monitor, and was called by her cardiologist to come into the ED to be evaluated. Pt. on CCM, NSR, speaking in clear complete sentences. pmh of: diabetes type 2, HTN, HLD, sinus congestion, migraines, herniated disc. ecg completed in triage.  Alessandra Copeland [phone] used.

## 2021-10-14 LAB
A1C WITH ESTIMATED AVERAGE GLUCOSE RESULT: 6.8 % — HIGH (ref 4–5.6)
ANION GAP SERPL CALC-SCNC: 9 MMOL/L — SIGNIFICANT CHANGE UP (ref 5–17)
BASOPHILS # BLD AUTO: 0.03 K/UL — SIGNIFICANT CHANGE UP (ref 0–0.2)
BASOPHILS NFR BLD AUTO: 0.9 % — SIGNIFICANT CHANGE UP (ref 0–2)
BUN SERPL-MCNC: 23 MG/DL — SIGNIFICANT CHANGE UP (ref 7–23)
CALCIUM SERPL-MCNC: 9.7 MG/DL — SIGNIFICANT CHANGE UP (ref 8.4–10.5)
CHLORIDE SERPL-SCNC: 101 MMOL/L — SIGNIFICANT CHANGE UP (ref 96–108)
CHOLEST SERPL-MCNC: 267 MG/DL — HIGH
CO2 SERPL-SCNC: 30 MMOL/L — SIGNIFICANT CHANGE UP (ref 22–31)
COVID-19 SPIKE DOMAIN AB INTERP: POSITIVE
COVID-19 SPIKE DOMAIN ANTIBODY RESULT: >250 U/ML — HIGH
CREAT SERPL-MCNC: 0.87 MG/DL — SIGNIFICANT CHANGE UP (ref 0.5–1.3)
EOSINOPHIL # BLD AUTO: 0.11 K/UL — SIGNIFICANT CHANGE UP (ref 0–0.5)
EOSINOPHIL NFR BLD AUTO: 3.4 % — SIGNIFICANT CHANGE UP (ref 0–6)
ESTIMATED AVERAGE GLUCOSE: 148 MG/DL — HIGH (ref 68–114)
GLUCOSE BLDC GLUCOMTR-MCNC: 100 MG/DL — HIGH (ref 70–99)
GLUCOSE BLDC GLUCOMTR-MCNC: 106 MG/DL — HIGH (ref 70–99)
GLUCOSE BLDC GLUCOMTR-MCNC: 109 MG/DL — HIGH (ref 70–99)
GLUCOSE BLDC GLUCOMTR-MCNC: 119 MG/DL — HIGH (ref 70–99)
GLUCOSE SERPL-MCNC: 126 MG/DL — HIGH (ref 70–99)
HCT VFR BLD CALC: 44.6 % — SIGNIFICANT CHANGE UP (ref 34.5–45)
HCV AB S/CO SERPL IA: 0.04 S/CO — SIGNIFICANT CHANGE UP
HCV AB SERPL-IMP: SIGNIFICANT CHANGE UP
HDLC SERPL-MCNC: 45 MG/DL — LOW
HGB BLD-MCNC: 14.1 G/DL — SIGNIFICANT CHANGE UP (ref 11.5–15.5)
IMM GRANULOCYTES NFR BLD AUTO: 0 % — SIGNIFICANT CHANGE UP (ref 0–1.5)
LIPID PNL WITH DIRECT LDL SERPL: 198 MG/DL — HIGH
LYMPHOCYTES # BLD AUTO: 1.4 K/UL — SIGNIFICANT CHANGE UP (ref 1–3.3)
LYMPHOCYTES # BLD AUTO: 43.6 % — SIGNIFICANT CHANGE UP (ref 13–44)
MAGNESIUM SERPL-MCNC: 2 MG/DL — SIGNIFICANT CHANGE UP (ref 1.6–2.6)
MCHC RBC-ENTMCNC: 28.5 PG — SIGNIFICANT CHANGE UP (ref 27–34)
MCHC RBC-ENTMCNC: 31.6 GM/DL — LOW (ref 32–36)
MCV RBC AUTO: 90.1 FL — SIGNIFICANT CHANGE UP (ref 80–100)
MONOCYTES # BLD AUTO: 0.37 K/UL — SIGNIFICANT CHANGE UP (ref 0–0.9)
MONOCYTES NFR BLD AUTO: 11.5 % — SIGNIFICANT CHANGE UP (ref 2–14)
NEUTROPHILS # BLD AUTO: 1.3 K/UL — LOW (ref 1.8–7.4)
NEUTROPHILS NFR BLD AUTO: 40.6 % — LOW (ref 43–77)
NON HDL CHOLESTEROL: 222 MG/DL — HIGH
NRBC # BLD: 0 /100 WBCS — SIGNIFICANT CHANGE UP (ref 0–0)
PLATELET # BLD AUTO: 242 K/UL — SIGNIFICANT CHANGE UP (ref 150–400)
POTASSIUM SERPL-MCNC: 3.5 MMOL/L — SIGNIFICANT CHANGE UP (ref 3.5–5.3)
POTASSIUM SERPL-SCNC: 3.5 MMOL/L — SIGNIFICANT CHANGE UP (ref 3.5–5.3)
RBC # BLD: 4.95 M/UL — SIGNIFICANT CHANGE UP (ref 3.8–5.2)
RBC # FLD: 13.4 % — SIGNIFICANT CHANGE UP (ref 10.3–14.5)
SARS-COV-2 IGG+IGM SERPL QL IA: >250 U/ML — HIGH
SARS-COV-2 IGG+IGM SERPL QL IA: POSITIVE
SODIUM SERPL-SCNC: 140 MMOL/L — SIGNIFICANT CHANGE UP (ref 135–145)
TRIGL SERPL-MCNC: 121 MG/DL — SIGNIFICANT CHANGE UP
TSH SERPL-MCNC: 2.57 UIU/ML — SIGNIFICANT CHANGE UP (ref 0.27–4.2)
WBC # BLD: 3.21 K/UL — LOW (ref 3.8–10.5)
WBC # FLD AUTO: 3.21 K/UL — LOW (ref 3.8–10.5)

## 2021-10-14 PROCEDURE — 93306 TTE W/DOPPLER COMPLETE: CPT | Mod: 26

## 2021-10-14 PROCEDURE — 71045 X-RAY EXAM CHEST 1 VIEW: CPT | Mod: 26

## 2021-10-14 PROCEDURE — 33286 RMVL SUBQ CAR RHYTHM MNTR: CPT

## 2021-10-14 PROCEDURE — 33208 INSRT HEART PM ATRIAL & VENT: CPT

## 2021-10-14 RX ORDER — DAPAGLIFLOZIN 10 MG/1
1 TABLET, FILM COATED ORAL
Qty: 0 | Refills: 0 | DISCHARGE

## 2021-10-14 RX ORDER — CEFAZOLIN SODIUM 1 G
1000 VIAL (EA) INJECTION ONCE
Refills: 0 | Status: DISCONTINUED | OUTPATIENT
Start: 2021-10-14 | End: 2021-10-14

## 2021-10-14 RX ORDER — GABAPENTIN 400 MG/1
100 CAPSULE ORAL AT BEDTIME
Refills: 0 | Status: DISCONTINUED | OUTPATIENT
Start: 2021-10-14 | End: 2021-10-15

## 2021-10-14 RX ORDER — ONDANSETRON 8 MG/1
1 TABLET, FILM COATED ORAL
Qty: 0 | Refills: 0 | DISCHARGE

## 2021-10-14 RX ORDER — POTASSIUM CHLORIDE 20 MEQ
40 PACKET (EA) ORAL ONCE
Refills: 0 | Status: COMPLETED | OUTPATIENT
Start: 2021-10-14 | End: 2021-10-14

## 2021-10-14 RX ORDER — DICLOFENAC SODIUM 75 MG/1
1 TABLET, DELAYED RELEASE ORAL
Qty: 0 | Refills: 0 | DISCHARGE

## 2021-10-14 RX ORDER — ASPIRIN/CALCIUM CARB/MAGNESIUM 324 MG
81 TABLET ORAL DAILY
Refills: 0 | Status: DISCONTINUED | OUTPATIENT
Start: 2021-10-14 | End: 2021-10-15

## 2021-10-14 RX ORDER — MIRTAZAPINE 45 MG/1
45 TABLET, ORALLY DISINTEGRATING ORAL AT BEDTIME
Refills: 0 | Status: DISCONTINUED | OUTPATIENT
Start: 2021-10-14 | End: 2021-10-15

## 2021-10-14 RX ORDER — OMEPRAZOLE 10 MG/1
1 CAPSULE, DELAYED RELEASE ORAL
Qty: 0 | Refills: 0 | DISCHARGE

## 2021-10-14 RX ORDER — CEFAZOLIN SODIUM 1 G
1000 VIAL (EA) INJECTION EVERY 8 HOURS
Refills: 0 | Status: COMPLETED | OUTPATIENT
Start: 2021-10-15 | End: 2021-10-15

## 2021-10-14 RX ORDER — PANTOPRAZOLE SODIUM 20 MG/1
40 TABLET, DELAYED RELEASE ORAL
Refills: 0 | Status: DISCONTINUED | OUTPATIENT
Start: 2021-10-14 | End: 2021-10-15

## 2021-10-14 RX ORDER — SUMATRIPTAN SUCCINATE 4 MG/.5ML
1 INJECTION, SOLUTION SUBCUTANEOUS
Qty: 0 | Refills: 0 | DISCHARGE

## 2021-10-14 RX ORDER — ATORVASTATIN CALCIUM 80 MG/1
40 TABLET, FILM COATED ORAL AT BEDTIME
Refills: 0 | Status: DISCONTINUED | OUTPATIENT
Start: 2021-10-14 | End: 2021-10-15

## 2021-10-14 RX ORDER — GABAPENTIN 400 MG/1
1 CAPSULE ORAL
Qty: 0 | Refills: 0 | DISCHARGE

## 2021-10-14 RX ORDER — ACETAMINOPHEN 500 MG
650 TABLET ORAL EVERY 4 HOURS
Refills: 0 | Status: DISCONTINUED | OUTPATIENT
Start: 2021-10-14 | End: 2021-10-15

## 2021-10-14 RX ORDER — LOSARTAN POTASSIUM 100 MG/1
50 TABLET, FILM COATED ORAL DAILY
Refills: 0 | Status: DISCONTINUED | OUTPATIENT
Start: 2021-10-14 | End: 2021-10-15

## 2021-10-14 RX ORDER — ASPIRIN/CALCIUM CARB/MAGNESIUM 324 MG
1 TABLET ORAL
Qty: 0 | Refills: 0 | DISCHARGE

## 2021-10-14 RX ORDER — MIRTAZAPINE 45 MG/1
1 TABLET, ORALLY DISINTEGRATING ORAL
Qty: 0 | Refills: 0 | DISCHARGE

## 2021-10-14 RX ORDER — METFORMIN HYDROCHLORIDE 850 MG/1
1 TABLET ORAL
Qty: 0 | Refills: 0 | DISCHARGE

## 2021-10-14 RX ORDER — EMPAGLIFLOZIN 10 MG/1
1 TABLET, FILM COATED ORAL
Qty: 0 | Refills: 0 | DISCHARGE

## 2021-10-14 RX ORDER — AMLODIPINE BESYLATE 2.5 MG/1
5 TABLET ORAL DAILY
Refills: 0 | Status: DISCONTINUED | OUTPATIENT
Start: 2021-10-14 | End: 2021-10-15

## 2021-10-14 RX ORDER — CEFAZOLIN SODIUM 1 G
2000 VIAL (EA) INJECTION ONCE
Refills: 0 | Status: COMPLETED | OUTPATIENT
Start: 2021-10-14 | End: 2021-10-14

## 2021-10-14 RX ORDER — CYCLOBENZAPRINE HYDROCHLORIDE 10 MG/1
1 TABLET, FILM COATED ORAL
Qty: 0 | Refills: 0 | DISCHARGE

## 2021-10-14 RX ORDER — TOPIRAMATE 25 MG
100 TABLET ORAL
Qty: 0 | Refills: 0 | DISCHARGE

## 2021-10-14 RX ORDER — TIZANIDINE 4 MG/1
1 TABLET ORAL
Qty: 0 | Refills: 0 | DISCHARGE

## 2021-10-14 RX ADMIN — MIRTAZAPINE 45 MILLIGRAM(S): 45 TABLET, ORALLY DISINTEGRATING ORAL at 22:28

## 2021-10-14 RX ADMIN — AMLODIPINE BESYLATE 5 MILLIGRAM(S): 2.5 TABLET ORAL at 09:48

## 2021-10-14 RX ADMIN — ATORVASTATIN CALCIUM 40 MILLIGRAM(S): 80 TABLET, FILM COATED ORAL at 22:28

## 2021-10-14 RX ADMIN — GABAPENTIN 100 MILLIGRAM(S): 400 CAPSULE ORAL at 22:29

## 2021-10-14 RX ADMIN — Medication 40 MILLIEQUIVALENT(S): at 09:48

## 2021-10-14 RX ADMIN — Medication 30 MILLIGRAM(S): at 20:15

## 2021-10-14 NOTE — PROGRESS NOTE ADULT - PROBLEM SELECTOR PLAN 5
On Migraine meds. Confirm current meds in AM with Pharamacy and f/u with outpt Neurologist. Currently asx. - Continue Buspar 30mg twice daily.

## 2021-10-14 NOTE — PROGRESS NOTE ADULT - PROBLEM SELECTOR PLAN 1
-presented with episode of syncope last night with documented 15sec pause on ILR corrosponding to event timing.  -Currently asx, continue telemetry monitoring.  -Hold BB  -Keep NPO after MN for planned PPM with EP Dr Booth  -Check ECHO  -Confirm Med in AM. Pt presented with episode of syncope last night with documented 15sec pause on ILR corresponding to event timing.  -Currently asx, continue telemetry monitoring.  -Hold BB  -ECHO 10/14/21: EF 61%, normal biventricular size and systolic function, No significant valvular disease, No pHTN or effusion.   -Pending PPM today 10/14/21 Pt presented with episode of syncope last night with documented 15sec pause on ILR corresponding to event timing.  -Currently asx, continue telemetry monitoring.  -Holding home Atenolol  -ECHO 10/14/21: EF 61%, normal biventricular size and systolic function, No significant valvular disease, No pHTN or effusion.   -Pending PPM today 10/14/21  -F/u EP recs

## 2021-10-14 NOTE — CHART NOTE - NSCHARTNOTEFT_GEN_A_CORE
ERNESTO HEMPHILL  [id number]    PROCEDURE:  pacemaker implant      INDICATION:  sick sinus syndrome      ELECTROPHYSIOLOGIST(S):  Dr. Gomez  Fellow Dr. Forbes      ANESTHESIOLOGY:  MAC, local      FINDINGS:  - uncomplicated implant of dual chamber pacemaker implant (Biotronik) via left sided cephalic vein cut down  - ILR explant  - Bedside echo showed no pericardial effusion at end of case      COMPLICATIONS:  none      RECOMMENDATIONS:  - f/u CXR tonight  - CXR PA/Lat. in AM  - post operative Abx as ordered

## 2021-10-14 NOTE — PROGRESS NOTE ADULT - PROBLEM SELECTOR PLAN 3
Follow up lipid panel in AM. Per [facility] takes Atorvastatin 20mg daily , HDL 45, Trig 121  - Per pharmacy she was prescribed Atorvastatin 20mg daily this month, was on 40mg daily in 7/2021  - Continue Atorvastatin 40mg daily

## 2021-10-14 NOTE — PROGRESS NOTE ADULT - SUBJECTIVE AND OBJECTIVE BOX
INCOMPLETE    S: Pt seen and examined bedside.  Patient denies C/P, SOB, N/V, dizziness, palpitations, and diaphoresis.  Pt denies fever/chills, dysuria, abdominal pain, diarrhea, and cough  12 Point ROS otherwise negative except as per HPI/subjective.     O: Vital Signs Last 24 Hrs  T(C): 36.1 (14 Oct 2021 09:08), Max: 36.8 (13 Oct 2021 14:06)  T(F): 96.9 (14 Oct 2021 09:08), Max: 98.3 (13 Oct 2021 14:06)  HR: 65 (14 Oct 2021 09:45) (61 - 78)  BP: 140/63 (14 Oct 2021 09:45) (106/58 - 149/88)  BP(mean): 106 (13 Oct 2021 18:23) (106 - 106)  RR: 16 (14 Oct 2021 09:45) (16 - 18)  SpO2: 100% (14 Oct 2021 09:45) (97% - 100%)    PHYSICAL EXAM:  GEN: NAD  HEENT: No JVD  PULM:  CTA B/L  CARD:  RRR, S1 and S2   ABD: +BS, NT, soft/ND	  EXT: No Edema B/L LE  NEURO: A+Ox3, no focal deficit  PSYCH: Mood Appropriate    LABS:                        14.1   3.21  )-----------( 242      ( 14 Oct 2021 06:53 )             44.6     10-14    140  |  101  |  23  ----------------------------<  126<H>  3.5   |  30  |  0.87    Ca    9.7      14 Oct 2021 06:53  Mg     2.0     10-14    TPro  8.3  /  Alb  5.2<H>  /  TBili  0.2  /  DBili  x   /  AST  22  /  ALT  19  /  AlkPhos  102  10-13    PT/INR - ( 13 Oct 2021 15:00 )   PT: 13.5 sec;   INR: 1.13          PTT - ( 13 Oct 2021 15:00 )  PTT:30.4 sec  Troponin T, Serum: 0.01 ng/mL (10-13-21 @ 15:00)      Daily Height in cm: 149.86 (14 Oct 2021 05:32)    Daily    S: Pt seen and examined bedside. Pt reports she is feeling well today, is wondering what the plan is for her pauses. Informed that the EP team will come see her to explain plan for PPM today.  Patient denies C/P, SOB, N/V, dizziness, palpitations, and diaphoresis.  Pt denies fever/chills, dysuria, abdominal pain, diarrhea, and cough  12 Point ROS otherwise negative except as per HPI/subjective.     O: Vital Signs Last 24 Hrs  T(C): 36.1 (14 Oct 2021 09:08), Max: 36.8 (13 Oct 2021 14:06)  T(F): 96.9 (14 Oct 2021 09:08), Max: 98.3 (13 Oct 2021 14:06)  HR: 65 (14 Oct 2021 09:45) (61 - 78)  BP: 140/63 (14 Oct 2021 09:45) (106/58 - 149/88)  BP(mean): 106 (13 Oct 2021 18:23) (106 - 106)  RR: 16 (14 Oct 2021 09:45) (16 - 18)  SpO2: 100% (14 Oct 2021 09:45) (97% - 100%)    PHYSICAL EXAM:  GEN: NAD  HEENT: No JVD  PULM:  CTA B/L, no WRR  CARD:  RRR, S1 and S2, no murmurs  ABD: +BS, NT, soft/ND	  EXT: No Edema B/L LE, 2+ pulses B/L   NEURO: A+Ox3, no focal deficit  PSYCH: Mood Appropriate    LABS:                        14.1   3.21  )-----------( 242      ( 14 Oct 2021 06:53 )             44.6     10-14    140  |  101  |  23  ----------------------------<  126<H>  3.5   |  30  |  0.87    Ca    9.7      14 Oct 2021 06:53  Mg     2.0     10-14    TPro  8.3  /  Alb  5.2<H>  /  TBili  0.2  /  DBili  x   /  AST  22  /  ALT  19  /  AlkPhos  102  10-13    PT/INR - ( 13 Oct 2021 15:00 )   PT: 13.5 sec;   INR: 1.13          PTT - ( 13 Oct 2021 15:00 )  PTT:30.4 sec  Troponin T, Serum: 0.01 ng/mL (10-13-21 @ 15:00)      Daily Height in cm: 149.86 (14 Oct 2021 05:32)    Daily

## 2021-10-14 NOTE — PROGRESS NOTE ADULT - PROBLEM SELECTOR PLAN 4
-F/U HgbA1C  -Monitor FSBG and ISS coverage  -Confirm Home DM meds in AM with pharamacy (SUrescripts notes Farxiga and Glipizide) - HgbA1C 6.8  - MISS ordered  - Home meds: Glipizide 10mg daily, Metformin 1G twice daily, Jardiance 25mg daily, - HgbA1C 6.8  - MISS ordered  - Home meds: Glipizide 10mg daily, Metformin 1G twice daily, Jardiance 25mg daily and Farxiga 10mg daily.  - Continue Gabapentin 100mg daily.

## 2021-10-14 NOTE — PROGRESS NOTE ADULT - PROBLEM SELECTOR PLAN 6
Per Surescripts takes Buspirone 30mg. Confirm in AM. VTE ppx: SCDs ordered; Pharm ppx held for PPM; Bedrest  Diet: DASH CHO restricted DIet,   DISPO: Home once medically cleared.

## 2021-10-14 NOTE — PROGRESS NOTE ADULT - ASSESSMENT
55 yo F with type II DM, HLD, HTN, Migraine HA, Low back pain, hx of leukocytosis followed by outpt Heme now WBC normalized, and h/o recurrent unexplained syncope s/p ILR 3/2019 now presents for recommended PPM placement after near-syncopal event with 15sec pause noted on ILR.  57 yo Kuwaiti speaking F with type II DM, HLD, HTN, Migraine HA, Low back pain, hx of leukocytosis followed by outpt Heme now WBC normalized, and h/o recurrent unexplained syncope s/p ILR 3/2019 revealing a 15 sec pause. Pt is now admitted to cardiology with plan for PPM on 10/14/21.

## 2021-10-14 NOTE — PROGRESS NOTE ADULT - PROBLEM SELECTOR PLAN 2
Monitor BP, confirm Home Meds in AM. 's   - Continue home Imdur 60mg daily. Per pharmacy she has been prescribed Amlodipine-Olmesartan 5-20mg PO QD and Losartan 50mg PO QD, Will continue Losartan 50mg and Amlodipine 5mg daily for now.   - Holding home Atenolol 50mg daily

## 2021-10-15 ENCOUNTER — TRANSCRIPTION ENCOUNTER (OUTPATIENT)
Age: 56
End: 2021-10-15

## 2021-10-15 VITALS — TEMPERATURE: 97 F

## 2021-10-15 PROBLEM — E78.5 HYPERLIPIDEMIA, UNSPECIFIED: Chronic | Status: ACTIVE | Noted: 2021-10-13

## 2021-10-15 PROBLEM — H81.10 BENIGN PAROXYSMAL VERTIGO, UNSPECIFIED EAR: Chronic | Status: ACTIVE | Noted: 2021-10-13

## 2021-10-15 PROBLEM — E11.9 TYPE 2 DIABETES MELLITUS WITHOUT COMPLICATIONS: Chronic | Status: ACTIVE | Noted: 2021-10-13

## 2021-10-15 PROBLEM — M50.20 OTHER CERVICAL DISC DISPLACEMENT, UNSPECIFIED CERVICAL REGION: Chronic | Status: ACTIVE | Noted: 2021-10-13

## 2021-10-15 PROBLEM — G43.909 MIGRAINE, UNSPECIFIED, NOT INTRACTABLE, WITHOUT STATUS MIGRAINOSUS: Chronic | Status: ACTIVE | Noted: 2021-10-13

## 2021-10-15 PROBLEM — I10 ESSENTIAL (PRIMARY) HYPERTENSION: Chronic | Status: ACTIVE | Noted: 2021-10-13

## 2021-10-15 LAB
ANION GAP SERPL CALC-SCNC: 17 MMOL/L — SIGNIFICANT CHANGE UP (ref 5–17)
BASOPHILS # BLD AUTO: 0.03 K/UL — SIGNIFICANT CHANGE UP (ref 0–0.2)
BASOPHILS NFR BLD AUTO: 0.3 % — SIGNIFICANT CHANGE UP (ref 0–2)
BUN SERPL-MCNC: 24 MG/DL — HIGH (ref 7–23)
CALCIUM SERPL-MCNC: 9.9 MG/DL — SIGNIFICANT CHANGE UP (ref 8.4–10.5)
CHLORIDE SERPL-SCNC: 97 MMOL/L — SIGNIFICANT CHANGE UP (ref 96–108)
CO2 SERPL-SCNC: 25 MMOL/L — SIGNIFICANT CHANGE UP (ref 22–31)
CREAT SERPL-MCNC: 0.86 MG/DL — SIGNIFICANT CHANGE UP (ref 0.5–1.3)
EOSINOPHIL # BLD AUTO: 0.13 K/UL — SIGNIFICANT CHANGE UP (ref 0–0.5)
EOSINOPHIL NFR BLD AUTO: 1.2 % — SIGNIFICANT CHANGE UP (ref 0–6)
GLUCOSE BLDC GLUCOMTR-MCNC: 139 MG/DL — HIGH (ref 70–99)
GLUCOSE SERPL-MCNC: 140 MG/DL — HIGH (ref 70–99)
HCT VFR BLD CALC: 47.8 % — HIGH (ref 34.5–45)
HGB BLD-MCNC: 15.1 G/DL — SIGNIFICANT CHANGE UP (ref 11.5–15.5)
IMM GRANULOCYTES NFR BLD AUTO: 0.5 % — SIGNIFICANT CHANGE UP (ref 0–1.5)
LYMPHOCYTES # BLD AUTO: 1.17 K/UL — SIGNIFICANT CHANGE UP (ref 1–3.3)
LYMPHOCYTES # BLD AUTO: 10.7 % — LOW (ref 13–44)
MAGNESIUM SERPL-MCNC: 1.8 MG/DL — SIGNIFICANT CHANGE UP (ref 1.6–2.6)
MCHC RBC-ENTMCNC: 28.4 PG — SIGNIFICANT CHANGE UP (ref 27–34)
MCHC RBC-ENTMCNC: 31.6 GM/DL — LOW (ref 32–36)
MCV RBC AUTO: 89.8 FL — SIGNIFICANT CHANGE UP (ref 80–100)
MONOCYTES # BLD AUTO: 0.42 K/UL — SIGNIFICANT CHANGE UP (ref 0–0.9)
MONOCYTES NFR BLD AUTO: 3.9 % — SIGNIFICANT CHANGE UP (ref 2–14)
NEUTROPHILS # BLD AUTO: 9.09 K/UL — HIGH (ref 1.8–7.4)
NEUTROPHILS NFR BLD AUTO: 83.4 % — HIGH (ref 43–77)
NRBC # BLD: 0 /100 WBCS — SIGNIFICANT CHANGE UP (ref 0–0)
PLATELET # BLD AUTO: 268 K/UL — SIGNIFICANT CHANGE UP (ref 150–400)
POTASSIUM SERPL-MCNC: 4.5 MMOL/L — SIGNIFICANT CHANGE UP (ref 3.5–5.3)
POTASSIUM SERPL-SCNC: 4.5 MMOL/L — SIGNIFICANT CHANGE UP (ref 3.5–5.3)
RBC # BLD: 5.32 M/UL — HIGH (ref 3.8–5.2)
RBC # FLD: 13.3 % — SIGNIFICANT CHANGE UP (ref 10.3–14.5)
SODIUM SERPL-SCNC: 139 MMOL/L — SIGNIFICANT CHANGE UP (ref 135–145)
WBC # BLD: 10.89 K/UL — HIGH (ref 3.8–10.5)
WBC # FLD AUTO: 10.89 K/UL — HIGH (ref 3.8–10.5)

## 2021-10-15 PROCEDURE — 71046 X-RAY EXAM CHEST 2 VIEWS: CPT | Mod: 26

## 2021-10-15 RX ORDER — ATORVASTATIN CALCIUM 80 MG/1
1 TABLET, FILM COATED ORAL
Qty: 0 | Refills: 0 | DISCHARGE
Start: 2021-10-15

## 2021-10-15 RX ORDER — IBUPROFEN 200 MG
1 TABLET ORAL
Qty: 0 | Refills: 0 | DISCHARGE

## 2021-10-15 RX ORDER — AMLODIPINE BESYLATE AND OLMESARTRAN MEDOXOMIL 10; 40 MG/1; MG/1
1 TABLET, FILM COATED ORAL
Qty: 0 | Refills: 0 | DISCHARGE

## 2021-10-15 RX ORDER — ATORVASTATIN CALCIUM 80 MG/1
1 TABLET, FILM COATED ORAL
Qty: 0 | Refills: 0 | DISCHARGE

## 2021-10-15 RX ORDER — ATENOLOL 25 MG/1
1 TABLET ORAL
Qty: 0 | Refills: 0 | DISCHARGE

## 2021-10-15 RX ORDER — MAGNESIUM SULFATE 500 MG/ML
2 VIAL (ML) INJECTION ONCE
Refills: 0 | Status: COMPLETED | OUTPATIENT
Start: 2021-10-15 | End: 2021-10-15

## 2021-10-15 RX ORDER — LOSARTAN POTASSIUM 100 MG/1
1 TABLET, FILM COATED ORAL
Qty: 0 | Refills: 0 | DISCHARGE

## 2021-10-15 RX ADMIN — Medication 81 MILLIGRAM(S): at 12:12

## 2021-10-15 RX ADMIN — Medication 100 MILLIGRAM(S): at 01:11

## 2021-10-15 RX ADMIN — PANTOPRAZOLE SODIUM 40 MILLIGRAM(S): 20 TABLET, DELAYED RELEASE ORAL at 06:35

## 2021-10-15 RX ADMIN — Medication 50 GRAM(S): at 09:16

## 2021-10-15 RX ADMIN — LOSARTAN POTASSIUM 50 MILLIGRAM(S): 100 TABLET, FILM COATED ORAL at 05:45

## 2021-10-15 RX ADMIN — Medication 30 MILLIGRAM(S): at 05:44

## 2021-10-15 RX ADMIN — AMLODIPINE BESYLATE 5 MILLIGRAM(S): 2.5 TABLET ORAL at 05:44

## 2021-10-15 RX ADMIN — Medication 100 MILLIGRAM(S): at 08:54

## 2021-10-15 NOTE — DISCHARGE NOTE PROVIDER - NSDCFUSCHEDAPPT_GEN_ALL_CORE_FT
ERNESTO HEMPHILL F ; 11/08/2021 ; [facility] [address]  ERNESTO HEMPHILL F ; 11/15/2021 ; [facility] [address]

## 2021-10-15 NOTE — DISCHARGE NOTE PROVIDER - CARE PROVIDER_API CALL
Ivonne Booth)  Cardiac Electrophysiology; Cardiovascular Disease; Internal Medicine  [address] [address]  Phone: [phone]  Fax: [phone]  Follow Up Time:     JIMI ESCALANTE  Internal Medicine  [address] [place] [id number]  Phone: [phone]  Fax: [phone]  Follow Up Time:

## 2021-10-15 NOTE — DISCHARGE NOTE NURSING/CASE MANAGEMENT/SOCIAL WORK - PATIENT PORTAL LINK FT
You can access the FollowMyHealth Patient Portal offered by [facility] by registering at the following website: [email or web address]. By joining [facility]’s FollowMyHealth portal, you will also be able to view your health information using other applications (apps) compatible with our system.

## 2021-10-15 NOTE — DISCHARGE NOTE PROVIDER - NSDCFUADDAPPT_GEN_ALL_CORE_FT
Please follow up with EP Dr. Booth on 11/8/21 @ 3:30 pm.   Please follow up with cardiologist Dr. Pastor in 1-2 week. Please call his office and make an appointment to see him.  Please follow up with EP Dr. Booth on 11/8/21 @ 3:30 pm.   Please follow up with cardiologist Dr. Pastor in 1 week. Please call his office and make an appointment to see him.

## 2021-10-15 NOTE — PROGRESS NOTE ADULT - SUBJECTIVE AND OBJECTIVE BOX
EPS Progress Note    S:     O: T(C): 36.1 (10-15-21 @ 10:35), Max: 37.1 (10-14-21 @ 22:51)  HR: 88 (10-15-21 @ 08:50) (70 - 88)  BP: 103/63 (10-15-21 @ 08:50) (103/63 - 123/70)  RR: 19 (10-15-21 @ 08:50) (16 - 19)  SpO2: 94% (10-15-21 @ 08:50) (94% - 100%)  Wt(kg): --    TELE:    PHYSICAL  General:  NAD        Chest:  CTA B/L, no w/r/r  Cardiac:  RRR, + s1/s2 , no m/g/r  Abdomen:   soft ND/NT  Extremities: No edema, b/l groin no hematoma/bleeding/oozing  Skin: no rash noted, normal color and pigmentation  Psych: A&Ox3, normal affect and mood  Neuro: no deficit noted     LABS:                        15.1   10.89 )-----------( 268      ( 15 Oct 2021 07:59 )             47.8     10-15    139  |  97  |  24<H>  ----------------------------<  140<H>  4.5   |  25  |  0.86    Ca    9.9      15 Oct 2021 07:59  Mg     1.8     10-15    TPro  8.3  /  Alb  5.2<H>  /  TBili  0.2  /  DBili  x   /  AST  22  /  ALT  19  /  AlkPhos  102  10-13    PT/INR - ( 13 Oct 2021 15:00 )   PT: 13.5 sec;   INR: 1.13          PTT - ( 13 Oct 2021 15:00 )  PTT:30.4 sec      MEDICATIONS:  acetaminophen   Tablet .. 650 milliGRAM(s) Oral every 4 hours PRN  amLODIPine   Tablet 5 milliGRAM(s) Oral daily  aspirin enteric coated 81 milliGRAM(s) Oral daily  atorvastatin 40 milliGRAM(s) Oral at bedtime  busPIRone 30 milliGRAM(s) Oral every 12 hours  gabapentin 100 milliGRAM(s) Oral at bedtime  glucagon  Injectable 1 milliGRAM(s) IntraMuscular once  influenza   Vaccine 0.5 milliLiter(s) IntraMuscular once  insulin lispro (ADMELOG) corrective regimen sliding scale   SubCutaneous Before meals and at bedtime  losartan 50 milliGRAM(s) Oral daily  mirtazapine 45 milliGRAM(s) Oral at bedtime  pantoprazole    Tablet 40 milliGRAM(s) Oral before breakfast      ASSESSMENT/PLAN      PLease include the following instructions in discharge paperwork :    You had a dual chamber pacemaker placed on 10/14/2021 by Dr. Ivonne hightower.     For the next 4 weeks, it is very important that you avoid any strenuous activities and heavy lifting (more than 5 pounds).     Please restrict your left arm movement to no higher than the level of your shoulder (ex: no reaching up). We encourage you to move your left arm passively, as to avoid a painful frozen shoulder. Do not pull or push your weight with your left arm.      Monitor your incision site for any active bleeding or drainage, any increase in swelling and pain despite over the counter tylenol. It is normal to see some bruising in the next few days.   You may shower normally tomorrow. Do not apply direct water pressure to incision site and avoid submerging the incision in bath, pool or jacuzzi, as to decrease risk for infection.     Leave your incision open to air. Do not apply any ointments, lotions, or powders or dressings to the incision. There is a medical glue over the incision that will flake off in the next few weeks on its own. Do not pick or peel the medical glue.     Your follow up appointment is scheduled for 11/8/21 @3:30pm with Dr. hightower to check your device and check your incision site.     **If you have any questions or concerns please call our office at [phone].**      EPS Progress Note    S:   - s/p dual chamber pacemaker biotronik  yesterday; no complications  - cxr this AM shows good lead placement  - device check shows to be normally functioning  - plan is for discharge to home today  - discharge instructions provided via       ______________________________________________________________________________________________________________________________________________                           Electrophysiology Device Interrogation     Indication: SSS  Device model: BIOTRONIK				                          Functioning Mode: DDD- CLS, lower rate 60, 		  Underlying Rhythm:  AS- VS 70-80s  Pacemaker dependency: not dependent  Battery status: FORREST  Interrogating parameters:   				RA			RV			  Sense:                                       5.2                               14.5  Threshold:                                  0.4                                0.7                                                  Pacing Impedance:                     604                               799                                                                                                                                         Events/Alert:  no events    Parameter change: none changed 	     Assessment: normally functioning pacemaker.  _______________________________________________________________________________________________________________________________________________       O: T(C): 36.1 (10-15-21 @ 10:35), Max: 37.1 (10-14-21 @ 22:51)  HR: 88 (10-15-21 @ 08:50) (70 - 88)  BP: 103/63 (10-15-21 @ 08:50) (103/63 - 123/70)  RR: 19 (10-15-21 @ 08:50) (16 - 19)  SpO2: 94% (10-15-21 @ 08:50) (94% - 100%)    TELE: SR 70-80    PHYSICAL  General:  NAD        Chest:  CTA B/L, no w/r/r  Cardiac:  RRR, + s1/s2 , no m/g/r  Abdomen:   soft ND/NT  Extremities: No edema, left chest incisions show no hematoma/bleeding/oozing  Skin: no rash noted, normal color and pigmentation  Psych: A&Ox3, normal affect and mood  Neuro: no deficit noted     LABS:                        15.1   10.89 )-----------( 268      ( 15 Oct 2021 07:59 )             47.8     139  |  97  |  24<H>  ----------------------------<  140<H>  4.5   |  25  |  0.86    Ca    9.9      15 Oct 2021 07:59  Mg     1.8     10-15    TPro  8.3  /  Alb  5.2<H>  /  TBili  0.2  /  DBili  x   /  AST  22  /  ALT  19  /  AlkPhos  102  10-13    PT/INR - ( 13 Oct 2021 15:00 )   PT: 13.5 sec;   INR: 1.13     PTT - ( 13 Oct 2021 15:00 )  PTT:30.4 sec    MEDICATIONS:  acetaminophen   Tablet .. 650 milliGRAM(s) Oral every 4 hours PRN  amLODIPine   Tablet 5 milliGRAM(s) Oral daily  aspirin enteric coated 81 milliGRAM(s) Oral daily  atorvastatin 40 milliGRAM(s) Oral at bedtime  busPIRone 30 milliGRAM(s) Oral every 12 hours  gabapentin 100 milliGRAM(s) Oral at bedtime  glucagon  Injectable 1 milliGRAM(s) IntraMuscular once  influenza   Vaccine 0.5 milliLiter(s) IntraMuscular once  insulin lispro (ADMELOG) corrective regimen sliding scale   SubCutaneous Before meals and at bedtime  losartan 50 milliGRAM(s) Oral daily  mirtazapine 45 milliGRAM(s) Oral at bedtime  pantoprazole    Tablet 40 milliGRAM(s) Oral before breakfast    ASSESSMENT & PLAN    Ms. Cervantes is a Botswanan speaking only 57 yo F with type II DM, HLD, and HTN and h/o recurrent unexplained syncope s/p ILR 3/2019 followed by EP Dr. Booth she had not had any recurrent syncope since her last visit on 7/27/21, but an ILR alert today showed she had a 15 sec pause which she endorsed she felt symptomatic. She was encouraged to come to ED for further evaluation and plan for PPM now is s/p biotronik dual chamber PPM by Dr. Booth.     - normally functioning PPM  - dsicharge instructions provided  - OK to discharge to home   - f/u appt made for 11/8/21 @3:30pm with Dr. Booth    Please include the following instructions in discharge paperwork :    You had a dual chamber pacemaker placed on 10/14/2021 by Dr. Ivonne booth.     For the next 4 weeks, it is very important that you avoid any strenuous activities and heavy lifting (more than 5 pounds).     Please restrict your left arm movement to no higher than the level of your shoulder (ex: no reaching up). We encourage you to move your left arm passively, as to avoid a painful frozen shoulder. Do not pull or push your weight with your left arm.      Monitor your incision site for any active bleeding or drainage, any increase in swelling and pain despite over the counter tylenol. It is normal to see some bruising in the next few days.   You may shower normally tomorrow. Do not apply direct water pressure to incision site and avoid submerging the incision in bath, pool or jacuzzi, as to decrease risk for infection.     Leave your incision open to air. Do not apply any ointments, lotions, or powders or dressings to the incision. There is a medical glue over the incision that will flake off in the next few weeks on its own. Do not pick or peel the medical glue.     Your follow up appointment is scheduled for 11/8/21 @3:30pm with Dr. booth to check your device and check your incision site.     **If you have any questions or concerns please call our office at [phone].**

## 2021-10-15 NOTE — DISCHARGE NOTE PROVIDER - CARE PROVIDERS DIRECT ADDRESSES
[email or web address]

## 2021-10-15 NOTE — DISCHARGE NOTE PROVIDER - NSDCCPCAREPLAN_GEN_ALL_CORE_FT
PRINCIPAL DISCHARGE DIAGNOSIS  Diagnosis: Syncope  Assessment and Plan of Treatment: You were admitted to the hospital with a syncopal episode and you underwent a Pacemaker placement on 10/14/21,. you had a ultrasound of your heart donw which is normal. Please follow up with EP Dr. Booth on 11/8/21 @ 3:30 pm  to check your device and check your incision site.   For the next 4 weeks, it is very important that you avoid any strenuous activities and heavy lifting (more than 5 pounds).   Please restrict your left arm movement to no higher than the level of your shoulder (ex: no reaching up). We encourage you to move your left arm passively, as to avoid a painful frozen shoulder. Do not pull or push your weight with your left arm.    Monitor your incision site for any active bleeding or drainage, any increase in swelling and pain despite over the counter tylenol. It is normal to see some bruising in the next few days.   You may shower normally tomorrow. Do not apply direct water pressure to incision site and avoid submerging the incision in bath, pool or jacuzzi, as to decrease risk for infection.   Leave your incision open to air. Do not apply any ointments, lotions, or powders or dressings to the incision. There is a medical glue over the incision that will flake off in the next few weeks on its own. Do not pick or peel the medical glue.   **If you have any questions or concerns please call our office at [phone].**         SECONDARY DISCHARGE DIAGNOSES  Diagnosis: HTN (hypertension)  Assessment and Plan of Treatment: Hypertension, commonly called high blood pressure, is when the force of blood pumping through your arteries is too strong. Hypertension forces your heart to work harder to pump blood. Your arteries may become narrow or stiff. Having untreated or uncontrolled hypertension for a long period of time can cause heart attack, stroke, kidney disease, and other problems. If started on a medication, take exactly as prescribed by your health care professional. Maintain a healthy lifestyle and follow up with your primary care physician.  - Please continue to take meds as listed.       Diagnosis: HLD (hyperlipidemia)  Assessment and Plan of Treatment: Please continue to take home meds as listed.    Diagnosis: DM (diabetes mellitus)  Assessment and Plan of Treatment:   •	Your HgA1c is 6.8 this admission. The HgA1c level checks your blood glucose for the past three months. Please continue a heart healthy diet low in sodium, cholesterol, and fat.  Please recheck your HgA1c level in 2-3 months with your primary care provider.  - Please continue to take all meds as listed.        PRINCIPAL DISCHARGE DIAGNOSIS  Diagnosis: Syncope  Assessment and Plan of Treatment: You were admitted to the hospital with a syncopal episode and you underwent a Pacemaker placement on 10/14/21,. you had a ultrasound of your heart donw which is normal. Please follow up with EP Dr. Booth on 11/8/21 @ 3:30 pm  to check your device and check your incision site.   For the next 4 weeks, it is very important that you avoid any strenuous activities and heavy lifting (more than 5 pounds).   Please restrict your left arm movement to no higher than the level of your shoulder (ex: no reaching up). We encourage you to move your left arm passively, as to avoid a painful frozen shoulder. Do not pull or push your weight with your left arm.    Monitor your incision site for any active bleeding or drainage, any increase in swelling and pain despite over the counter tylenol. It is normal to see some bruising in the next few days.   You may shower normally tomorrow. Do not apply direct water pressure to incision site and avoid submerging the incision in bath, pool or jacuzzi, as to decrease risk for infection.   Leave your incision open to air. Do not apply any ointments, lotions, or powders or dressings to the incision. There is a medical glue over the incision that will flake off in the next few weeks on its own. Do not pick or peel the medical glue.   **If you have any questions or concerns please call our office at [phone].**         SECONDARY DISCHARGE DIAGNOSES  Diagnosis: HTN (hypertension)  Assessment and Plan of Treatment: Hypertension, commonly called high blood pressure, is when the force of blood pumping through your arteries is too strong. Hypertension forces your heart to work harder to pump blood. Your arteries may become narrow or stiff. Having untreated or uncontrolled hypertension for a long period of time can cause heart attack, stroke, kidney disease, and other problems. If started on a medication, take exactly as prescribed by your health care professional. Maintain a healthy lifestyle and follow up with your primary care physician.  - Please take Atenolol 50 mg daily only for now since your blood pressure was on the soft side in the hospital.   - For blood pressure that is too high; you can take Norvasc 5 mg daily only as needed. Please follow up with your cardiologist in 1 week to go over your BP medication management.       Diagnosis: HLD (hyperlipidemia)  Assessment and Plan of Treatment: Please continue to take home med Lipitor 40 mg daily  as listed.    Diagnosis: DM (diabetes mellitus)  Assessment and Plan of Treatment:   •	Your HgA1c is 6.8 this admission. The HgA1c level checks your blood glucose for the past three months. Please continue a heart healthy diet low in sodium, cholesterol, and fat.  Please recheck your HgA1c level in 2-3 months with your primary care provider.  - Please continue to take all meds as listed.

## 2021-10-15 NOTE — DISCHARGE NOTE PROVIDER - HOSPITAL COURSE
57 yo Greenlandic speaking F with type II DM, HLD, HTN, Migraine HA, Low back pain, hx of leukocytosis followed by outpt Heme now WBC normalized, and h/o recurrent unexplained syncope s/p ILR 3/2019 revealing a 15 sec pause. Pt is now admitted to cardiology with plan for PPM , s/p PPM 10/14/21.  ECHO 10/14/21: EF 61%, normal biventricular size and systolic function, No significant valvular disease, No pHTN or effusion.        Problem/Plan - 2:  ·  Problem: HTN (hypertension).   ·  Plan: 's   - Continue home Imdur 60mg daily. Per pharmacy she has been prescribed Amlodipine-Olmesartan 5-20mg PO QD and Losartan 50mg PO QD, Will continue Losartan 50mg and Amlodipine 5mg daily for now.   - Holding home Atenolol 50mg daily.      Pt. seen and examined at bedside today am. Pt. comfortable, denies any CP, SOB, dizziness, palpitations, R radial access site stable, no bleeding and hematoma noted, R radial pulse 2 + b/l.   		  VSS. Labs stable o/n. home meds reviwed with  … and pt. to be d/c on   	  Pt. stable to be d/c as per  …and to f/u with  … in 1-2 week   Patient has been given appropriate discharge instructions including medication regimen, access site management and follow up. Prescriptions have been e-prescribed to patient's preferred pharmacy   55 y/o Bel[place]an speaking F with type II DM, HLD, HTN, Migraine HA, Low back pain, hx of leukocytosis followed by outpt Heme now WBC normalized, and h/o recurrent unexplained syncope s/p ILR 3/2019 revealing a 15 sec pause. Pt is now admitted to cardiology with plan for PPM , s/p PPM 10/14/21; PPM interrogated by EP team on day of discharge functioning well; CXR on day of d/c normal.  ECHO 10/14/21: EF 61%, normal biventricular size and systolic function, No significant valvular disease, No pHTN or effusion.     -120s this admission; as verified with pharmacy pt on multiple HTN meds; atenolol 50 mg daily, amlodipine-olmesartan 5-20 mg daily, propranol 80 mg daily, losartan 50 mg daily and imdur 60 mg daily; patient unaware what meds she is taking; tried to verify meds with cardiologist Dr. Pastor, unsuccessful to reach him. Therefore; currently we are only d/c patient only on atenolol 50 mg daily and instructed patient to take Norvasc 5 mg daily PRN for eleavted BP and to follow up with her cardiologist in 1 week.     Pt. seen and examined at bedside today am. Pt. comfortable, denies any CP, SOB, dizziness, palpitations, PPM site stable; + gauze clear. VSS. Labs stable o/n except for WBC 10 but patient afebrile and asx. . home meds reviwed with Dr. Crouch.  Pt. stable to be d/c as per Dr. Crouch and to f/u with Dr. Pastor in 1 week and to f/u with Dr. Booth on 11/8/21 @ 3:30 pm.   Patient has been given appropriate discharge instructions including medication regimen, access site management and follow up.

## 2021-10-15 NOTE — DISCHARGE NOTE NURSING/CASE MANAGEMENT/SOCIAL WORK - NSDCFUADDAPPT_GEN_ALL_CORE_FT
Please follow up with EP Dr. Booth on 11/8/21 @ 3:30 pm.   Please follow up with cardiologist Dr. Pastor in 1 week. Please call his office and make an appointment to see him.

## 2021-10-15 NOTE — DISCHARGE NOTE PROVIDER - NSDCMRMEDTOKEN_GEN_ALL_CORE_FT
amlodipine-olmesartan 5 mg-20 mg oral tablet: 1 tab(s) orally once a day  Aspirin Low Dose 81 mg oral delayed release tablet: 1 tab(s) orally once a day  atorvastatin 20 mg oral tablet: 1 tab(s) orally once a day  busPIRone 30 mg oral tablet: 1 tab(s) orally once a day  cyclobenzaprine 5 mg oral tablet: 1 tab(s) orally 3 times a day, As Needed  diclofenac sodium 75 mg oral delayed release tablet: 1 tab(s) orally 2 times a day  Farxiga 10 mg oral tablet: 1 tab(s) orally once a day  gabapentin 100 mg oral capsule: 1 cap(s) orally once a day (at bedtime)  ibuprofen 600 mg oral tablet: 1 tab(s) orally 3 times a day, As Needed  Jardiance 25 mg oral tablet: 1 tab(s) orally once a day (in the morning)  losartan 50 mg oral tablet: 1 tab(s) orally once a day  metFORMIN 1000 mg oral tablet, extended release: 1 tab(s) orally 2 times a day  mirtazapine 45 mg oral tablet: 1 tab(s) orally once a day (at bedtime)  omeprazole 20 mg oral delayed release capsule: 1 cap(s) orally once a day  tiZANidine 4 mg oral tablet: 1 tab(s) orally once a day (at bedtime), As Needed   Aspirin Low Dose 81 mg oral delayed release tablet: 1 tab(s) orally once a day  atenolol 50 mg oral tablet: 1 tab(s) orally once a day  atorvastatin 40 mg oral tablet: 1 tab(s) orally once a day (at bedtime)  busPIRone 30 mg oral tablet: 1 tab(s) orally once a day  cyclobenzaprine 5 mg oral tablet: 1 tab(s) orally 3 times a day, As Needed  diclofenac sodium 75 mg oral delayed release tablet: 1 tab(s) orally 2 times a day  Farxiga 10 mg oral tablet: 1 tab(s) orally once a day  gabapentin 100 mg oral capsule: 1 cap(s) orally once a day (at bedtime)  Jardiance 25 mg oral tablet: 1 tab(s) orally once a day (in the morning)  metFORMIN 1000 mg oral tablet, extended release: 1 tab(s) orally 2 times a day  mirtazapine 45 mg oral tablet: 1 tab(s) orally once a day (at bedtime)  omeprazole 20 mg oral delayed release capsule: 1 cap(s) orally once a day  tiZANidine 4 mg oral tablet: 1 tab(s) orally once a day (at bedtime), As Needed

## 2021-10-22 DIAGNOSIS — H81.10 BENIGN PAROXYSMAL VERTIGO, UNSPECIFIED EAR: ICD-10-CM

## 2021-10-22 DIAGNOSIS — G43.909 MIGRAINE, UNSPECIFIED, NOT INTRACTABLE, WITHOUT STATUS MIGRAINOSUS: ICD-10-CM

## 2021-10-22 DIAGNOSIS — I49.5 SICK SINUS SYNDROME: ICD-10-CM

## 2021-10-22 DIAGNOSIS — Z79.84 LONG TERM (CURRENT) USE OF ORAL HYPOGLYCEMIC DRUGS: ICD-10-CM

## 2021-10-22 DIAGNOSIS — Z95.818 PRESENCE OF OTHER CARDIAC IMPLANTS AND GRAFTS: ICD-10-CM

## 2021-10-22 DIAGNOSIS — F41.9 ANXIETY DISORDER, UNSPECIFIED: ICD-10-CM

## 2021-10-22 DIAGNOSIS — M50.20 OTHER CERVICAL DISC DISPLACEMENT, UNSPECIFIED CERVICAL REGION: ICD-10-CM

## 2021-10-22 DIAGNOSIS — E78.5 HYPERLIPIDEMIA, UNSPECIFIED: ICD-10-CM

## 2021-10-22 DIAGNOSIS — Z79.82 LONG TERM (CURRENT) USE OF ASPIRIN: ICD-10-CM

## 2021-10-22 DIAGNOSIS — R55 SYNCOPE AND COLLAPSE: ICD-10-CM

## 2021-10-22 DIAGNOSIS — E11.9 TYPE 2 DIABETES MELLITUS WITHOUT COMPLICATIONS: ICD-10-CM

## 2021-10-22 DIAGNOSIS — I10 ESSENTIAL (PRIMARY) HYPERTENSION: ICD-10-CM

## 2021-11-08 ENCOUNTER — APPOINTMENT (OUTPATIENT)
Dept: HEART AND VASCULAR | Facility: CLINIC | Age: 56
End: 2021-11-08
Payer: MEDICARE

## 2021-11-08 VITALS
HEIGHT: 60 IN | DIASTOLIC BLOOD PRESSURE: 74 MMHG | SYSTOLIC BLOOD PRESSURE: 128 MMHG | BODY MASS INDEX: 20.03 KG/M2 | WEIGHT: 102 LBS | HEART RATE: 80 BPM

## 2021-11-08 PROCEDURE — 93280 PM DEVICE PROGR EVAL DUAL: CPT

## 2021-11-08 NOTE — DISCUSSION/SUMMARY
[Pacemaker Function Normal] : normal pacemaker function [FreeTextEntry1] : Miss Cervantes is a 56 year old female with a a hisotry of syncope s/p ILR implantation, s/p dual chamber pacemaker implantation for a 15 second -pause noted on the ilr. she presents today for a follow-up. \par Normal functioning dual chamber pacemaker. Pacemaker site is healing well. Patient to follow up in 6 months, all questions were answered.

## 2021-11-08 NOTE — PHYSICAL EXAM
[General Appearance - Well Developed] : well developed [Normal Appearance] : normal appearance [Well Groomed] : well groomed [General Appearance - Well Nourished] : well nourished [No Deformities] : no deformities [General Appearance - In No Acute Distress] : no acute distress [Left Infraclavicular] : left infraclavicular area [Clean] : clean [Dry] : dry [Healing Well] : healing well [Well-Healed] : not well-healed [Palpable Crepitus] : no palpable crepitus [Bleeding] : no active bleeding [Foul Odor] : no foul smell [Erythema] : not erythematous [Warm] : not warm [Tender] : not tender [Indurated] : not indurated

## 2021-11-08 NOTE — PROCEDURE
[No] : not [NSR] : normal sinus rhythm [Pacemaker] : pacemaker [DDD] : DDD [Threshold Testing Performed] : Threshold testing was performed [Lead Imp:  ___ohms] : lead impedance was [unfilled] ohms [Sensing Amplitude ___mv] : sensing amplitude was [unfilled] mv [___V @] : [unfilled] V [___ ms] : [unfilled] ms [None] : none [Auto Capture "On"] : auto capture was switched "On" [Apace-Vsense ___ %] : Apace-Vsense [unfilled]% [de-identified] : [employer] [de-identified] : Lucie 8 JUAN  [de-identified] : [id number] [de-identified] : 10/14/2021 [de-identified] : cls [de-identified] : 60 [de-identified] : 10 y 5 months

## 2021-11-08 NOTE — HISTORY OF PRESENT ILLNESS
[de-identified] : Ms. Ruiz is a 56 year-old woman with type II DM, HLD, and HTN with recurrent syncope, she had an ILR implanted in 2019, she was found to have a 15 second pause and underwent a dual chamber pacemaker implantation on 10/14/2021. She presents today for a follow-up. She has been doing well since implantation, no further syncopal episodes. She has mild discomfort at the site. denies fever chills, discharge from the sire.

## 2021-11-15 ENCOUNTER — APPOINTMENT (OUTPATIENT)
Dept: HEART AND VASCULAR | Facility: CLINIC | Age: 56
End: 2021-11-15

## 2021-12-22 PROCEDURE — C1769: CPT

## 2021-12-22 PROCEDURE — 80061 LIPID PANEL: CPT

## 2021-12-22 PROCEDURE — 80048 BASIC METABOLIC PNL TOTAL CA: CPT

## 2021-12-22 PROCEDURE — 84484 ASSAY OF TROPONIN QUANT: CPT

## 2021-12-22 PROCEDURE — 84443 ASSAY THYROID STIM HORMONE: CPT

## 2021-12-22 PROCEDURE — 87635 SARS-COV-2 COVID-19 AMP PRB: CPT

## 2021-12-22 PROCEDURE — 85730 THROMBOPLASTIN TIME PARTIAL: CPT

## 2021-12-22 PROCEDURE — 83880 ASSAY OF NATRIURETIC PEPTIDE: CPT

## 2021-12-22 PROCEDURE — 82962 GLUCOSE BLOOD TEST: CPT

## 2021-12-22 PROCEDURE — 36415 COLL VENOUS BLD VENIPUNCTURE: CPT

## 2021-12-22 PROCEDURE — 86803 HEPATITIS C AB TEST: CPT

## 2021-12-22 PROCEDURE — 85025 COMPLETE CBC W/AUTO DIFF WBC: CPT

## 2021-12-22 PROCEDURE — 93005 ELECTROCARDIOGRAM TRACING: CPT

## 2021-12-22 PROCEDURE — 83735 ASSAY OF MAGNESIUM: CPT

## 2021-12-22 PROCEDURE — 93306 TTE W/DOPPLER COMPLETE: CPT

## 2021-12-22 PROCEDURE — 83036 HEMOGLOBIN GLYCOSYLATED A1C: CPT

## 2021-12-22 PROCEDURE — 80053 COMPREHEN METABOLIC PANEL: CPT

## 2021-12-22 PROCEDURE — C1785: CPT

## 2021-12-22 PROCEDURE — 85610 PROTHROMBIN TIME: CPT

## 2021-12-22 PROCEDURE — C1898: CPT

## 2021-12-22 PROCEDURE — 71046 X-RAY EXAM CHEST 2 VIEWS: CPT

## 2021-12-22 PROCEDURE — C1892: CPT

## 2021-12-22 PROCEDURE — 71045 X-RAY EXAM CHEST 1 VIEW: CPT

## 2021-12-22 PROCEDURE — 99285 EMERGENCY DEPT VISIT HI MDM: CPT

## 2021-12-22 PROCEDURE — 86769 SARS-COV-2 COVID-19 ANTIBODY: CPT

## 2022-01-18 ENCOUNTER — NON-APPOINTMENT (OUTPATIENT)
Age: 57
End: 2022-01-18

## 2022-01-18 ENCOUNTER — APPOINTMENT (OUTPATIENT)
Dept: HEART AND VASCULAR | Facility: CLINIC | Age: 57
End: 2022-01-18
Payer: MEDICARE

## 2022-01-18 PROCEDURE — 93296 REM INTERROG EVL PM/IDS: CPT

## 2022-01-18 PROCEDURE — 93294 REM INTERROG EVL PM/LDLS PM: CPT

## 2022-01-24 ENCOUNTER — APPOINTMENT (OUTPATIENT)
Dept: HEART AND VASCULAR | Facility: CLINIC | Age: 57
End: 2022-01-24
Payer: MEDICARE

## 2022-01-24 VITALS
DIASTOLIC BLOOD PRESSURE: 77 MMHG | WEIGHT: 105 LBS | HEIGHT: 60 IN | TEMPERATURE: 97 F | SYSTOLIC BLOOD PRESSURE: 121 MMHG | BODY MASS INDEX: 20.62 KG/M2 | HEART RATE: 102 BPM

## 2022-01-24 PROCEDURE — 93280 PM DEVICE PROGR EVAL DUAL: CPT

## 2022-01-25 NOTE — REASON FOR VISIT
[New Patient Device Check] : is here today for a new patient device check visit for [Interpreters_IDNumber] : [employer] [id number] [TWNoteComboBox1] : [place]

## 2022-01-25 NOTE — DISCUSSION/SUMMARY
[Pacemaker Function Normal] : normal pacemaker function [FreeTextEntry1] : Ms. Cervantes is a 56 year old female with a a history of syncope s/p ILR implantation with a 15 second pause correlating with syncope.  She is s/p dual chamber pacemaker implantation.  Her device is functioning appropriately as programmed and all measured data is WNL.  Advised to return for follow-up in six months or sooner as needed.

## 2022-01-25 NOTE — HISTORY OF PRESENT ILLNESS
[de-identified] : Ms. Ruiz is a pleasant 56 year-old female with a past medical history significant for HTN, HLD, DM, and syncope s/p ILR placement significant for a 15 second pause. She is now s/p PPM placement 10/14/21 and presents for routine follow-up.  She denies recurrent presyncope/syncope.

## 2022-01-25 NOTE — PROCEDURE
[No] : not [NSR] : normal sinus rhythm [Pacemaker] : pacemaker [DDD] : DDD [Threshold Testing Performed] : Threshold testing was performed [Lead Imp:  ___ohms] : lead impedance was [unfilled] ohms [Sensing Amplitude ___mv] : sensing amplitude was [unfilled] mv [___V @] : [unfilled] V [___ ms] : [unfilled] ms [None] : none [Auto Capture "On"] : auto capture was switched "On" [Apace-Vsense ___ %] : Apace-Vsense [unfilled]% [de-identified] : [employer] [de-identified] : Lucie 8 JUAN  [de-identified] : [id number] [de-identified] : 10/14/2021 [de-identified] : cls [de-identified] : 60 [de-identified] : 10 y 5 months

## 2022-02-01 ENCOUNTER — APPOINTMENT (OUTPATIENT)
Dept: HEART AND VASCULAR | Facility: CLINIC | Age: 57
End: 2022-02-01
Payer: MEDICARE

## 2022-02-01 VITALS
HEIGHT: 60 IN | BODY MASS INDEX: 20.62 KG/M2 | SYSTOLIC BLOOD PRESSURE: 149 MMHG | HEART RATE: 80 BPM | WEIGHT: 105 LBS | DIASTOLIC BLOOD PRESSURE: 86 MMHG

## 2022-02-01 PROCEDURE — 93280 PM DEVICE PROGR EVAL DUAL: CPT

## 2022-02-01 NOTE — HISTORY OF PRESENT ILLNESS
[de-identified] : Ms. Ruiz is a pleasant 56 year-old female with a past medical history significant for HTN, HLD, DM, and syncope s/p ILR placement significant for a 15 second pause. She is now s/p PPM placement 10/14/21 and presents for an acute visit today.  She notes having discomfort in her left shoulder after having what she describes as TENS therapy at physical therapy a few days ago.\par \par She denies recurrent presyncope/syncope.

## 2022-02-01 NOTE — DISCUSSION/SUMMARY
[Pacemaker Function Normal] : normal pacemaker function [FreeTextEntry1] : Ms. Cervantes is a 56 year old female with a a history of syncope s/p ILR implantation with a 15 second pause correlating with syncope.  She is s/p dual chamber pacemaker implantation.  Her device is functioning appropriately as programmed and all measured data is WNL.  There are no new arrhythmia events for review.  Sinus tach events noted from date of her prior follow-up 1/24/22.  No LUE edema appreciated today on exam.  Advised to follow-up with her PMD for management of shoulder pain.  Advised to avoid TENS therapy given she has a pacemaker in place.  She may otherwise participate in physical therapy and can raise her left arm above shoulder height.  Advised to return for follow-up in six months or sooner as needed.

## 2022-02-01 NOTE — PROCEDURE
[No] : not [NSR] : normal sinus rhythm [Pacemaker] : pacemaker [DDD] : DDD [Threshold Testing Performed] : Threshold testing was performed [Lead Imp:  ___ohms] : lead impedance was [unfilled] ohms [Sensing Amplitude ___mv] : sensing amplitude was [unfilled] mv [___V @] : [unfilled] V [___ ms] : [unfilled] ms [None] : none [Auto Capture "On"] : auto capture was switched "On" [Apace-Vsense ___ %] : Apace-Vsense [unfilled]% [de-identified] : [employer] [place] [employer] [de-identified] : Lucie 8 JUAN  [de-identified] : [id number] [de-identified] : 10/14/2021 [de-identified] : cls [de-identified] : 60 [de-identified] : 10 y 5 months [de-identified] : No new events\par AP 62\par  0

## 2022-02-01 NOTE — REASON FOR VISIT
[New Patient Device Check] : is here today for a new patient device check visit for [Interpreters_IDNumber] : [employer] [id number] [TWNoteComboBox1] : Vincentian

## 2022-02-16 ENCOUNTER — NON-APPOINTMENT (OUTPATIENT)
Age: 57
End: 2022-02-16

## 2022-04-19 ENCOUNTER — NON-APPOINTMENT (OUTPATIENT)
Age: 57
End: 2022-04-19

## 2022-04-19 ENCOUNTER — APPOINTMENT (OUTPATIENT)
Dept: HEART AND VASCULAR | Facility: CLINIC | Age: 57
End: 2022-04-19
Payer: MEDICARE

## 2022-04-19 PROCEDURE — 93294 REM INTERROG EVL PM/LDLS PM: CPT

## 2022-04-19 PROCEDURE — 93296 REM INTERROG EVL PM/IDS: CPT

## 2022-05-09 ENCOUNTER — APPOINTMENT (OUTPATIENT)
Dept: HEART AND VASCULAR | Facility: CLINIC | Age: 57
End: 2022-05-09

## 2022-05-16 NOTE — H&P ADULT - NSHPSOCIALHISTORY_GEN_ALL_CORE
- - -
, lives with 2 her two teenage daughters age 17 & 18    Denies TOB, ETOH, Illicit Drug usage    Not working

## 2022-07-18 ENCOUNTER — APPOINTMENT (OUTPATIENT)
Dept: HEART AND VASCULAR | Facility: CLINIC | Age: 57
End: 2022-07-18

## 2022-07-18 VITALS
TEMPERATURE: 97.8 F | SYSTOLIC BLOOD PRESSURE: 133 MMHG | BODY MASS INDEX: 21.2 KG/M2 | HEART RATE: 75 BPM | HEIGHT: 60 IN | WEIGHT: 108 LBS | DIASTOLIC BLOOD PRESSURE: 65 MMHG

## 2022-07-18 PROCEDURE — 93280 PM DEVICE PROGR EVAL DUAL: CPT

## 2022-07-19 ENCOUNTER — NON-APPOINTMENT (OUTPATIENT)
Age: 57
End: 2022-07-19

## 2022-07-19 ENCOUNTER — APPOINTMENT (OUTPATIENT)
Dept: HEART AND VASCULAR | Facility: CLINIC | Age: 57
End: 2022-07-19

## 2022-07-19 PROCEDURE — 93294 REM INTERROG EVL PM/LDLS PM: CPT

## 2022-07-19 PROCEDURE — 93296 REM INTERROG EVL PM/IDS: CPT

## 2022-08-01 NOTE — PROCEDURE
[No] : not [NSR] : normal sinus rhythm [Pacemaker] : pacemaker [DDD] : DDD [Threshold Testing Performed] : Threshold testing was performed [Lead Imp:  ___ohms] : lead impedance was [unfilled] ohms [Sensing Amplitude ___mv] : sensing amplitude was [unfilled] mv [___V @] : [unfilled] V [___ ms] : [unfilled] ms [None] : none [Auto Capture "On"] : auto capture was switched "On" [Apace-Vsense ___ %] : Apace-Vsense [unfilled]% [de-identified] : [employer] [de-identified] : Lucie 8 JUAN  [de-identified] : [id number] [de-identified] : 10/14/2021 [de-identified] : cls [de-identified] : 60 [de-identified] : 9 y 11 months [de-identified] : No new events\par AP 64\par  0\par CLS response increased from medium to high

## 2022-08-01 NOTE — HISTORY OF PRESENT ILLNESS
[de-identified] : Ms. Ruiz is a pleasant 56 year-old female with a past medical history significant for HTN, HLD, DM, and syncope s/p ILR placement significant for a 15 second pause. She is now s/p PPM placement 10/14/21 and presents for routine follow-up.  \par \par She notes recurrent syncope over the past several months; prodromal dizziness, nausea, diaphoresis.  No trauma.

## 2022-08-01 NOTE — DISCUSSION/SUMMARY
[Pacemaker Function Normal] : normal pacemaker function [FreeTextEntry1] : Ms. Cervantes is a 56 year old female with a a history of syncope s/p ILR implantation with a 15 second pause correlating with syncope.  She is s/p dual chamber pacemaker implantation.  Her device is functioning appropriately as programmed and all measured data is WNL.  CLS made more aggressive given history of recurrent syncope.  There are no new arrhythmia events for review.  Advised to return for follow-up in six months or sooner as needed.

## 2022-08-01 NOTE — REASON FOR VISIT
[Follow-up Device Check] : is here today for a follow-up device check visit for [Interpreters_IDNumber] : [employer] [id number] [TWNoteComboBox1] : [place]

## 2022-11-02 ENCOUNTER — APPOINTMENT (OUTPATIENT)
Dept: HEART AND VASCULAR | Facility: CLINIC | Age: 57
End: 2022-11-02

## 2022-11-02 ENCOUNTER — NON-APPOINTMENT (OUTPATIENT)
Age: 57
End: 2022-11-02

## 2022-11-02 PROCEDURE — 93296 REM INTERROG EVL PM/IDS: CPT

## 2022-11-02 PROCEDURE — 93294 REM INTERROG EVL PM/LDLS PM: CPT

## 2023-01-23 ENCOUNTER — APPOINTMENT (OUTPATIENT)
Dept: HEART AND VASCULAR | Facility: CLINIC | Age: 58
End: 2023-01-23
Payer: MEDICARE

## 2023-01-23 ENCOUNTER — NON-APPOINTMENT (OUTPATIENT)
Age: 58
End: 2023-01-23

## 2023-01-23 VITALS
HEART RATE: 94 BPM | DIASTOLIC BLOOD PRESSURE: 71 MMHG | HEIGHT: 60 IN | SYSTOLIC BLOOD PRESSURE: 135 MMHG | TEMPERATURE: 95 F

## 2023-01-23 VITALS — WEIGHT: 107 LBS | BODY MASS INDEX: 20.9 KG/M2

## 2023-01-23 DIAGNOSIS — R55 SYNCOPE AND COLLAPSE: ICD-10-CM

## 2023-01-23 PROCEDURE — 93280 PM DEVICE PROGR EVAL DUAL: CPT

## 2023-01-23 NOTE — DISCUSSION/SUMMARY
[Pacemaker Function Normal] : normal pacemaker function [FreeTextEntry1] : Ms. Cervantes is a 57 year old female with a a history of syncope s/p ILR implantation with a 15 second pause correlating with syncope.  She is s/p dual chamber pacemaker implantation.  Her device is functioning appropriately as programmed and all measured data is WNL. There are no new arrhythmia events for review.  Advised her symptoms of lightheadedness and dizziness may be blood pressure related and encouraged to stay well hydrated. Advised to return for follow-up in six months or sooner as needed.

## 2023-01-23 NOTE — REASON FOR VISIT
[Follow-up Device Check] : is here today for a follow-up device check visit for [Interpreters_IDNumber] : [employer] [id number] [TWNoteComboBox1] : Mauritanian

## 2023-01-23 NOTE — PROCEDURE
[No] : not [NSR] : normal sinus rhythm [Pacemaker] : pacemaker [DDD] : DDD [Threshold Testing Performed] : Threshold testing was performed [Lead Imp:  ___ohms] : lead impedance was [unfilled] ohms [Sensing Amplitude ___mv] : sensing amplitude was [unfilled] mv [___V @] : [unfilled] V [___ ms] : [unfilled] ms [None] : none [Auto Capture "On"] : auto capture was switched "On" [Apace-Vsense ___ %] : Apace-Vsense [unfilled]% [de-identified] : [employer] [de-identified] : Lucie 8 JUAN  [de-identified] : [id number] [de-identified] : 10/14/2021 [de-identified] : cls [de-identified] : 60 [de-identified] : 9 y 11 months [de-identified] : No new events\par AP 64\par  0\par

## 2023-01-23 NOTE — HISTORY OF PRESENT ILLNESS
[de-identified] : Ms. Ruiz is a pleasant 57 year-old female with a past medical history significant for HTN, HLD, DM, and syncope s/p ILR placement significant for a 15 second pause. She is now s/p PPM placement 10/14/21 and presents for routine follow-up.  \par \par She notes episodes of lightheadedness and room spinning that can occur for a few hours at a time.  This has occurred over the last three days and occur without any provocation.  She denies any chest pain, palpitations, nausea, diaphoresis, or dyspnea during these events.  She occasional checks her blood pressure when it occurs which has been otherwise normal.

## 2023-04-24 ENCOUNTER — APPOINTMENT (OUTPATIENT)
Dept: HEART AND VASCULAR | Facility: CLINIC | Age: 58
End: 2023-04-24
Payer: MEDICARE

## 2023-04-24 ENCOUNTER — NON-APPOINTMENT (OUTPATIENT)
Age: 58
End: 2023-04-24

## 2023-04-24 PROCEDURE — 93294 REM INTERROG EVL PM/LDLS PM: CPT

## 2023-04-24 PROCEDURE — 93296 REM INTERROG EVL PM/IDS: CPT

## 2023-07-24 ENCOUNTER — APPOINTMENT (OUTPATIENT)
Dept: HEART AND VASCULAR | Facility: CLINIC | Age: 58
End: 2023-07-24

## 2023-08-01 ENCOUNTER — NON-APPOINTMENT (OUTPATIENT)
Age: 58
End: 2023-08-01

## 2023-08-01 ENCOUNTER — APPOINTMENT (OUTPATIENT)
Dept: HEART AND VASCULAR | Facility: CLINIC | Age: 58
End: 2023-08-01
Payer: MEDICARE

## 2023-08-01 VITALS
BODY MASS INDEX: 21.4 KG/M2 | HEIGHT: 60 IN | WEIGHT: 109 LBS | DIASTOLIC BLOOD PRESSURE: 48 MMHG | SYSTOLIC BLOOD PRESSURE: 149 MMHG | HEART RATE: 107 BPM

## 2023-08-01 PROCEDURE — 93280 PM DEVICE PROGR EVAL DUAL: CPT

## 2023-08-01 NOTE — DISCUSSION/SUMMARY
[Pacemaker Function Normal] : normal pacemaker function [FreeTextEntry1] : Ms. Cervantes is a 57 year old female with a a history of syncope s/p ILR implantation with a 15 second pause correlating with syncope.  She is s/p dual chamber pacemaker implantation.  The device is functioning appropriately as programmed and all measured data is WNL.  The patient is enrolled in remote monitoring and was asked to return for follow-up in six months.  Advised to call with any questions or concerns in the interim.

## 2023-08-01 NOTE — HISTORY OF PRESENT ILLNESS
[de-identified] : Ms. Ruiz is a pleasant 57 year-old female with a past medical history significant for HTN, HLD, DM, and syncope s/p ILR placement significant for a 15 second pause. She is now s/p PPM placement 10/14/21 and presents for routine follow-up.  She offers no acute complaints.  SEE PACEART

## 2023-08-01 NOTE — REASON FOR VISIT
[Follow-up Device Check] : is here today for a follow-up device check visit for [Interpreters_IDNumber] : [employer] [id number]

## 2023-11-30 ENCOUNTER — NON-APPOINTMENT (OUTPATIENT)
Age: 58
End: 2023-11-30

## 2023-11-30 ENCOUNTER — APPOINTMENT (OUTPATIENT)
Dept: HEART AND VASCULAR | Facility: CLINIC | Age: 58
End: 2023-11-30
Payer: MEDICARE

## 2023-11-30 PROCEDURE — 93296 REM INTERROG EVL PM/IDS: CPT

## 2023-11-30 PROCEDURE — 93294 REM INTERROG EVL PM/LDLS PM: CPT

## 2024-02-05 ENCOUNTER — NON-APPOINTMENT (OUTPATIENT)
Age: 59
End: 2024-02-05

## 2024-02-05 ENCOUNTER — APPOINTMENT (OUTPATIENT)
Dept: HEART AND VASCULAR | Facility: CLINIC | Age: 59
End: 2024-02-05
Payer: MEDICARE

## 2024-02-05 VITALS
HEART RATE: 89 BPM | DIASTOLIC BLOOD PRESSURE: 80 MMHG | WEIGHT: 108 LBS | SYSTOLIC BLOOD PRESSURE: 140 MMHG | BODY MASS INDEX: 21.2 KG/M2 | TEMPERATURE: 97 F | HEIGHT: 60 IN

## 2024-02-05 PROCEDURE — 93280 PM DEVICE PROGR EVAL DUAL: CPT

## 2024-02-05 RX ORDER — HYDROXYZINE HYDROCHLORIDE 25 MG/1
25 TABLET ORAL
Qty: 60 | Refills: 0 | Status: ACTIVE | COMMUNITY
Start: 2023-08-24

## 2024-02-05 RX ORDER — ARIPIPRAZOLE 2 MG/1
2 TABLET ORAL
Qty: 30 | Refills: 0 | Status: ACTIVE | COMMUNITY
Start: 2023-08-24

## 2024-02-05 NOTE — DISCUSSION/SUMMARY
[Pacemaker Function Normal] : normal pacemaker function [FreeTextEntry1] : Ms. Cervantes is a 58 year old female with a a history of syncope s/p ILR implantation with a 15 second pause correlating with syncope.  She is s/p dual chamber pacemaker implantation.  No recurrent syncope post procedure.  The device is functioning appropriately as programmed and all measured data is WNL.  One brief episode of SVT (AT) that she was unaware of; will continue to monitor.  The patient is enrolled in remote monitoring and was asked to return for follow-up in six months.  Advised to call with any questions or concerns in the interim.

## 2024-02-05 NOTE — ADDENDUM
[FreeTextEntry1] : I, Ivonne Booth, hereby attest that the medical record entry for this patient accurately reflects signatures/notations that I made on the Date of Service in my capacity as an Attending Physician when I treated/diagnosed the above patient. I do hereby attest that this information is true, accurate and complete to the best of my knowledge.  I was present for the entire visit and supervised the entire visit and made/agree with the plan as outlined.

## 2024-02-05 NOTE — REASON FOR VISIT
[Follow-up Device Check] : is here today for a follow-up device check visit for [Interpreters_IDNumber] : [id number] [TWNoteComboBox1] : Mexican

## 2024-02-05 NOTE — HISTORY OF PRESENT ILLNESS
[de-identified] : Ms. Ruiz is a pleasant 58 year-old female with a past medical history significant for HTN, HLD, DM, and syncope s/p ILR placement significant for a 15 second pause. She is now s/p PPM placement 10/14/21 and presents for routine follow-up.  She offers no acute complaints.  SEE PACEART

## 2024-05-05 ENCOUNTER — NON-APPOINTMENT (OUTPATIENT)
Age: 59
End: 2024-05-05

## 2024-05-06 ENCOUNTER — APPOINTMENT (OUTPATIENT)
Dept: HEART AND VASCULAR | Facility: CLINIC | Age: 59
End: 2024-05-06
Payer: MEDICARE

## 2024-05-06 PROCEDURE — 93296 REM INTERROG EVL PM/IDS: CPT

## 2024-05-06 PROCEDURE — 93294 REM INTERROG EVL PM/LDLS PM: CPT

## 2024-08-05 ENCOUNTER — APPOINTMENT (OUTPATIENT)
Dept: HEART AND VASCULAR | Facility: CLINIC | Age: 59
End: 2024-08-05

## 2024-08-05 PROCEDURE — 93280 PM DEVICE PROGR EVAL DUAL: CPT

## 2024-08-12 NOTE — HISTORY OF PRESENT ILLNESS
[de-identified] : Ms. Ruiz is a pleasant 58 year-old female with a past medical history significant for HTN, HLD, DM, and syncope s/p ILR placement significant for a 15 second pause. She is now s/p PPM placement 10/14/21.  She presents for routine f/u and offers no device related complaints.  No recurrent syncope.   SEE PACEART

## 2024-08-12 NOTE — DISCUSSION/SUMMARY
[Pacemaker Function Normal] : normal pacemaker function [FreeTextEntry1] : Ms. Cervantes is a 58 year old female with a a history of syncope s/p ILR implantation with a 15 second pause correlating with syncope.  She is s/p dual chamber pacemaker implantation.   She has had no recurrent syncope post procedure.  The device is functioning appropriately as programmed and all measured data is WNL. The patient is enrolled in remote monitoring and was asked to return for follow-up in six months.  Advised to call with any questions or concerns in the interim.

## 2024-08-12 NOTE — REASON FOR VISIT
[Follow-up Device Check] : is here today for a follow-up device check visit for [Interpreters_IDNumber] : [id number] [TWNoteComboBox1] : [place]ian

## 2024-08-12 NOTE — ADDENDUM
[FreeTextEntry1] : I, Emy Calderon, am scribing for and the presence of Dr. Booth the following sections: HPI, PMH,Family/social history, ROS, Physical Exam, Assessment / Plan.   I, Ivonne Booth, personally performed the services described in the documentation, reviewed the documentation recorded by the scribe in my presence and it accurately and completely records my words and actions.

## 2024-11-04 ENCOUNTER — NON-APPOINTMENT (OUTPATIENT)
Age: 59
End: 2024-11-04

## 2024-11-04 ENCOUNTER — APPOINTMENT (OUTPATIENT)
Dept: HEART AND VASCULAR | Facility: CLINIC | Age: 59
End: 2024-11-04
Payer: MEDICARE

## 2024-11-04 PROCEDURE — 93296 REM INTERROG EVL PM/IDS: CPT

## 2024-11-04 PROCEDURE — 93294 REM INTERROG EVL PM/LDLS PM: CPT

## 2025-02-04 ENCOUNTER — NON-APPOINTMENT (OUTPATIENT)
Age: 60
End: 2025-02-04

## 2025-02-04 ENCOUNTER — APPOINTMENT (OUTPATIENT)
Dept: HEART AND VASCULAR | Facility: CLINIC | Age: 60
End: 2025-02-04
Payer: MEDICARE

## 2025-02-04 VITALS
BODY MASS INDEX: 20.42 KG/M2 | DIASTOLIC BLOOD PRESSURE: 71 MMHG | WEIGHT: 104 LBS | HEART RATE: 95 BPM | HEIGHT: 60 IN | TEMPERATURE: 97.7 F | SYSTOLIC BLOOD PRESSURE: 134 MMHG

## 2025-02-04 PROCEDURE — 93280 PM DEVICE PROGR EVAL DUAL: CPT

## 2025-05-06 ENCOUNTER — NON-APPOINTMENT (OUTPATIENT)
Age: 60
End: 2025-05-06

## 2025-05-06 ENCOUNTER — APPOINTMENT (OUTPATIENT)
Dept: HEART AND VASCULAR | Facility: CLINIC | Age: 60
End: 2025-05-06
Payer: MEDICARE

## 2025-05-06 PROCEDURE — 93294 REM INTERROG EVL PM/LDLS PM: CPT

## 2025-05-06 PROCEDURE — 93296 REM INTERROG EVL PM/IDS: CPT

## 2025-08-07 ENCOUNTER — APPOINTMENT (OUTPATIENT)
Dept: HEART AND VASCULAR | Facility: CLINIC | Age: 60
End: 2025-08-07
Payer: MEDICARE

## 2025-08-07 ENCOUNTER — NON-APPOINTMENT (OUTPATIENT)
Age: 60
End: 2025-08-07

## 2025-08-07 VITALS
WEIGHT: 95 LBS | DIASTOLIC BLOOD PRESSURE: 79 MMHG | TEMPERATURE: 98.1 F | HEART RATE: 99 BPM | OXYGEN SATURATION: 99 % | HEIGHT: 60 IN | SYSTOLIC BLOOD PRESSURE: 119 MMHG | BODY MASS INDEX: 18.65 KG/M2

## 2025-08-07 DIAGNOSIS — R55 SYNCOPE AND COLLAPSE: ICD-10-CM

## 2025-08-07 DIAGNOSIS — Z78.9 OTHER SPECIFIED HEALTH STATUS: ICD-10-CM

## 2025-08-07 PROCEDURE — 93280 PM DEVICE PROGR EVAL DUAL: CPT
